# Patient Record
Sex: MALE | Race: WHITE | NOT HISPANIC OR LATINO | Employment: OTHER | URBAN - METROPOLITAN AREA
[De-identification: names, ages, dates, MRNs, and addresses within clinical notes are randomized per-mention and may not be internally consistent; named-entity substitution may affect disease eponyms.]

---

## 2018-02-13 ENCOUNTER — HOSPITAL ENCOUNTER (OUTPATIENT)
Dept: LAB | Facility: CLINIC | Age: 48
Discharge: HOME | End: 2018-02-13
Attending: INTERNAL MEDICINE | Admitting: INTERNAL MEDICINE
Payer: COMMERCIAL

## 2018-02-13 ENCOUNTER — OFFICE VISIT (OUTPATIENT)
Dept: SLEEP MEDICINE | Facility: CLINIC | Age: 48
End: 2018-02-13
Payer: COMMERCIAL

## 2018-02-13 VITALS
OXYGEN SATURATION: 95 % | SYSTOLIC BLOOD PRESSURE: 130 MMHG | DIASTOLIC BLOOD PRESSURE: 92 MMHG | BODY MASS INDEX: 26.96 KG/M2 | WEIGHT: 182 LBS | HEIGHT: 69 IN | RESPIRATION RATE: 16 BRPM | HEART RATE: 97 BPM

## 2018-02-13 DIAGNOSIS — G47.33 OSA (OBSTRUCTIVE SLEEP APNEA): Primary | ICD-10-CM

## 2018-02-13 DIAGNOSIS — E61.1 IRON DEFICIENCY: ICD-10-CM

## 2018-02-13 DIAGNOSIS — G25.81 RESTLESS LEGS SYNDROME (RLS): ICD-10-CM

## 2018-02-13 LAB
FERRITIN SERPL-MCNC: 366 NG/ML (ref 26–388)
IRON SATN MFR SERPL: 27 % (ref 15–46)
IRON SERPL-MCNC: 102 UG/DL (ref 35–180)
TIBC SERPL-MCNC: 373 UG/DL (ref 240–430)

## 2018-02-13 PROCEDURE — 83550 IRON BINDING TEST: CPT | Performed by: INTERNAL MEDICINE

## 2018-02-13 PROCEDURE — 99204 OFFICE O/P NEW MOD 45 MIN: CPT | Performed by: INTERNAL MEDICINE

## 2018-02-13 PROCEDURE — 83540 ASSAY OF IRON: CPT | Performed by: INTERNAL MEDICINE

## 2018-02-13 PROCEDURE — 36415 COLL VENOUS BLD VENIPUNCTURE: CPT | Performed by: INTERNAL MEDICINE

## 2018-02-13 PROCEDURE — 82728 ASSAY OF FERRITIN: CPT | Performed by: INTERNAL MEDICINE

## 2018-02-13 RX ORDER — GABAPENTIN 300 MG/1
300 CAPSULE ORAL EVERY EVENING
Qty: 30 CAPSULE | Refills: 3 | Status: SHIPPED | OUTPATIENT
Start: 2018-02-13 | End: 2018-06-12

## 2018-02-13 NOTE — MR AVS SNAPSHOT
"              After Visit Summary   2/13/2018    Wellington Moreno    MRN: [id number]           Patient Information     Date Of Birth          1970        Visit Information        Provider Department      2/13/2018 2:00 PM Suhas Cameron MD [facility]        Today's Diagnoses     ZHANG (obstructive sleep apnea)    -  1    Restless legs syndrome (RLS)        Iron deficiency          Care Instructions    1. Overnight sleep study \to assess sleep apnea   2. Start Gabapentin 300 mg at 10 pm   3. Blood test for iron deficiency   4. Follow up after sleep study       MY TREATMENT INFORMATION FOR SLEEP APNEA-  Wellington Josh    DOCTOR : Suhas Cameron MD  SLEEP CENTER :      MY CONTACT NUMBER:     Am I having a sleep study at a sleep center?  Make sure you have an appointment for the study before you leave!    Am I having a home sleep study?  Watch this video:  https://www.[email or web address]/watch?v=CteI_GhyP9g&[email or web address]    Frequently asked questions:  1. What is Obstructive Sleep Apnea (ZHANG)? ZHANG is the most common type of sleep apnea. Apnea means, \"without breath.\"  Apnea is most often caused by narrowing or collapse of the upper airway as muscles relax during sleep.   Almost everyone has occasional apneas. Most people with sleep apnea have had brief interruptions at night frequently for many years.  The severity of sleep apnea is related to how frequent and severe the events are.   2. What are the consequences of ZHANG? Symptoms include: feeling sleepy during the day, snoring loudly, gasping or stopping of breathing, trouble sleeping, and occasionally morning headaches or heartburn at night.  Sleepiness can be serious and even increase the risk of falling asleep while driving. Other health consequences may include development of high blood pressure and other cardiovascular disease in persons who are susceptible. Untreated ZHANG  can contribute to heart disease, stroke and diabetes.   3. What " are the treatment options? In most situations, sleep apnea is a lifelong disease that must be managed with daily therapy. Medications are not effective for sleep apnea and surgery is generally not considered until other therapies have been tried. Your treatment is your choice . Continuous Positive Airway (CPAP) works right away and is the therapy that is effective in nearly everyone. An oral device to hold your jaw forward is usually the next most reliable option. Other options include postioning devices (to keep you off your back), weight loss, and surgery including a tongue pacing device. There is more detail about some of these options below.    Important tips for using CPAP and similar devices   Know your equipment:  CPAP is continuous positive airway pressure that prevents obstructive sleep apnea by keeping the throat from collapsing while you are sleeping. In most cases, the device is  smart  and can slowly self-adjusts if your throat collapses and keeps a record every day of how well you are treated-this information is available to you and your care team.  BPAP is bilevel positive airway pressure that keeps your throat open and also assists each breath with a pressure boost to maintain adequate breathing.  Special kinds of BPAP are used in patients who have inadequate breathing from lung or heart disease. In most cases, the device is  smart  and can slowly self-adjusts to assist breathing. Like CPAP, the device keeps a record of how well you are treated.  Your mask is your connection to the device. You get to choose what feels most comfortable and the staff will help to make sure if fits. Here: are some examples of the different masks that are available:       Key points to remember on your journey with sleep apnea:  1. Sleep study.  PAP devices often need to be adjusted during a sleep study to show that they are effective and adjusted right.  2. Good tips to remember: Try wearing just the mask during a quiet  time during the day so your body adapts to wearing it. A humidifier is recommended for comfort in most cases to prevent drying of your nose and throat. Allergy medication from your provider may help you if you are having nasal congestion.  3. Getting settled-in. It takes more than one night for most of us to get used to wearing a mask. Try wearing just the mask during a quiet time during the day so your body adapts to wearing it. A humidifier is recommended for comfort in most cases. Our team will work with you carefully on the first day and will be in contact within 4 days and again at 2 and 4 weeks for advice and remote device adjustments. Your therapy is evaluated by the device each day.   4. Use it every night. The more you are able to sleep naturally for 7-8 hours, the more likely you will have good sleep and to prevent health risks or symptoms from sleep apnea. Even if you use it 4 hours it helps. Occasionally all of us are unable to use a medical therapy, in sleep apnea, it is not dangerous to miss one night.   5. Communicate. Call our skilled team on the number provided on the first day if your visit for problems that make it difficult to wear the device. Over 2 out of 3 patients can learn to wear the device long-term with help from our team. Remember to call our team or your sleep providers if you are unable to wear the device as we may have other solutions for those who cannot adapt to mask CPAP therapy. It is recommended that you sleep your sleep provider within the first 3 months and yearly after that if you are not having problems.   Take care of your equipment. Make sure you clean your mask and tubing using directions every day and that your filter and mask are replaced as recommended or if they are not working.     BESIDES CPAP, WHAT OTHER THERAPIES ARE THERE?    Positioning Device  Positioning devices are generally used when sleep apnea is mild and only occurs on your back.This example shows a pillow  that straps around the waist. It may be appropriate for those whose sleep study shows milder sleep apnea that occurs primarily when lying flat on one's back. Preliminary studies have shown benefit but effectiveness at home may need to be verified by a home sleep test. These devices are generally not covered by medical insurance.  Examples of devices that maintain sleeping on the back to prevent snoring and mild sleep apnea.    Belt type body positioner  Http://CadenceMD.SolveBoard/    Electronic reminder  Http://nightshifttherapy.com/  Http://www.[employer].SolveBoard.au/    Oral Appliance  What is oral appliance therapy?  An oral appliance device fits on your teeth at night like a retainer used after having braces. The device is made by a specialized dentist and requires several visits over 1-2 months before a manufactured device is made to fit your teeth and is adjusted to prevent your sleep apnea. Once an oral device is working properly, snoring should be improved. A home sleep test may be recommended at that time if to determine whether the sleep apnea is adequately treated.       Some things to remember:  -Oral devices are often, but not always, covered by your medical insurance. Be sure to check with your insurance provider.   -If you are referred for oral therapy, you will be given a list of specialized dentists to consider or you may choose to visit the Web site of the American Academy of Dental Sleep Medicine  -Oral devices are less likely to work if you have severe sleep apnea or are extremely overweight.     More detailed information  An oral appliance is a small acrylic device that fits over the upper and lower teeth  (similar to a retainer or a mouth guard). This device slightly moves jaw forward, which moves the base of the tongue forward, opens the airway, improves breathing for effective treat snoring and obstructive sleep apnea in perhaps 7 out of 10 people .  The best working devices are custom-made by a dental  device  after a mold is made of the teeth 1, 2, 3.  When is an oral appliance indicated?  Oral appliance therapy is recommended as a first-line treatment for patients with primary snoring, mild sleep apnea, and for patients with moderate sleep apnea who prefer appliance therapy to use of CPAP4, 5. Severity of sleep apnea is determined by sleep testing and is based on the number of respiratory events per hour of sleep.   How successful is oral appliance therapy?  The success rate of oral appliance therapy in patients with mild sleep apnea is 75-80% while in patients with moderate sleep apnea it is 50-70%. The chance of success in patients with severe sleep apnea is 40-50%. The research also shows that oral appliances have a beneficial effect on the cardiovascular health of ZHANG patients at the same magnitude as CPAP therapy7.  Oral appliances should be a second-line treatment in cases of severe sleep apnea, but if not completely successful then a combination therapy utilizing CPAP plus oral appliance therapy may be effective. Oral appliances tend to be effective in a broad range of patients although studies show that the patients who have the highest success are females, younger patients, those with milder disease, and less severe obesity. 3, 6.   Finding a dentist that practices dental sleep medicine  Specific training is available through the American Academy of Dental Sleep Medicine for dentists interested in working in the field of sleep. To find a dentist who is educated in the field of sleep and the use of oral appliances, near you, visit the Web site of the American Academy of Dental Sleep Medicine.    References  1. Grabiel et al. Objectively measured vs self-reported compliance during oral appliance therapy for sleep-disordered breathing. Chest 2013; 144(5): 3171-9550.  2. Ofe et al. Objective measurement of compliance during oral appliance therapy for sleep-disordered breathing.  Thorax 2013; 68(1): 91-96.  3. Christi et al. Mandibular advancement devices in 620 men and women with ZHANG and snoring: tolerability and predictors of treatment success. Chest 2004; 125: 1280-9424.  4. Grant et al. Oral appliances for snoring and ZHANG: a review. Sleep 2006; 29: 244-262.  5. Reina et al. Oral appliance treatment for ZHANG: an update. J Clin Sleep Med 2014; 10(2): 215-227.  6. Cheyanne et al. Predictors of OSAH treatment outcome. J Dent Res 2007; 86: 4245-7982.      Weight Loss:    Weight loss is a long-term strategy that may improve sleep apnea in some patients.    Weight management is a personal decision and the decision should be based on your interest and the potential benefits.  If you are interested in exploring weight loss strategies, the following discussion covers the impact on weight loss on sleep apnea and the approaches that may be successful.    Being overweight does not necessarily mean you will have health consequences.  Those who have BMI over 35 or over 27 with existing medical conditions carries greater risk.   Weight loss decreases severity of sleep apnea in most people with obesity. For those with mild obesity who have developed snoring with weight gain, even 15-30 pound weight loss can improve and occasionally eliminate sleep apnea.  Structured and life-long dietary and health habits are necessary to lose weight and keep healthier weight levels.     Though there may be significant health benefits from weight loss, long-term weight loss is very difficult to achieve- studies show success with dietary management in less than 10% of people. In addition, substantial weight loss may require years of dietary control and may be difficult if patients have severe obesity. In these cases, surgical management may be considered.  Finally, older individuals who have tolerated obesity without health complications may be less likely to benefit from weight loss strategies.        Your BMI  is Body mass index is 26.88 kg/(m^2).  Weight management is a personal decision.  If you are interested in exploring weight loss strategies, the following discussion covers the approaches that may be successful. Body mass index (BMI) is one way to tell whether you are at a healthy weight, overweight, or obese. It measures your weight in relation to your height.  A BMI of 18.5 to 24.9 is in the healthy range. A person with a BMI of 25 to 29.9 is considered overweight, and someone with a BMI of 30 or greater is considered obese. More than two-thirds of American adults are considered overweight or obese.  Being overweight or obese increases the risk for further weight gain. Excess weight may lead to heart disease and diabetes.  Creating and following plans for healthy eating and physical activity may help you improve your health.  Weight control is part of healthy lifestyle and includes exercise, emotional health, and healthy eating habits. Careful eating habits lifelong are the mainstay of weight control. Though there are significant health benefits from weight loss, long-term weight loss with diet alone may be very difficult to achieve- studies show long-term success with dietary management in less than 10% of people. Attaining a healthy weight may be especially difficult to achieve in those with severe obesity. In some cases, medications, devices and surgical management might be considered.  What can you do?  If you are overweight or obese and are interested in methods for weight loss, you should discuss this with your provider.     Consider reducing daily calorie intake by 500 calories.     Keep a food journal.     Avoiding skipping meals, consider cutting portions instead.    Diet combined with exercise helps maintain muscle while optimizing fat loss. Strength training is particularly important for building and maintaining muscle mass. Exercise helps reduce stress, increase energy, and improves fitness. Increasing  exercise without diet control, however, may not burn enough calories to loose weight.       Start walking three days a week 10-20 minutes at a time    Work towards walking thirty minutes five days a week     Eventually, increase the speed of your walking for 1-2 minutes at time    In addition, we recommend that you review healthy lifestyles and methods for weight loss available through the National Institutes of Health patient information sites:  http://win.niddk.nih.gov/publications/index.htm    And look into health and wellness programs that may be available through your health insurance provider, employer, local community center, or hernan club.    Weight management plan: Patient was referred to their PCP to discuss a diet and exercise plan.      Surgery:    Surgery for obstructive sleep apnea is considered generally only when other therapies fail to work. Surgery may be discussed with you if you are having a difficult time tolerating CPAP and or when there is an abnormal structure that requires surgical correction.  Nose and throat surgeries often enlarge the airway to prevent collapse.  Most of these surgeries create pain for 1-2 weeks and up to half of the most common surgeries are not effective throughout life.  You should carefully discuss the benefits and drawbacks to surgery with your sleep provider and surgeon to determine if it is the best solution for you.   More information  Surgery for ZHANG is directed at areas that are responsible for narrowing or complete obstruction of the airway during sleep.  There are a wide range of procedures available to enlarge and/or stabilize the airway to prevent blockage of breathing in the three major areas where it can occur: the palate, tongue, and nasal regions.  Successful surgical treatment depends on the accurate identification of the factors responsible for obstructive sleep apnea in each person.  A personalized approach is required because there is no single  treatment that works well for everyone.  Because of anatomic variation, consultation with an examination by a sleep surgeon is a critical first step in determining what surgical options are best for each patient.  In some cases, examination during sedation may be recommended in order to guide the selection of procedures.  Patients will be counseled about risks and benefits as well as the typical recovery course after surgery. Surgery is typically not a cure for a person s ZHANG.  However, surgery will often significantly improve one s ZHANG severity (termed  success rate ).  Even in the absence of a cure, surgery will decrease the cardiovascular risk associated with OSA7; improve overall quality of life8 (sleepiness, functionality, sleep quality, etc).      Palate Procedures:  Patients with ZHANG often have narrowing of their airway in the region of their tonsils and uvula.  The goals of palate procedures are to widen the airway in this region as well as to help the tissues resist collapse.  Modern palate procedure techniques focus on tissue conservation and soft tissue rearrangement, rather than tissue removal.  Often the uvula is preserved in this procedure. Residual sleep apnea is common in patient after pharyngoplasty with an average reduction in sleep apnea events of 33%2.      Tongue Procedures:  ExamWhile patients are awake, the muscles that surround the throat are active and keep this region open for breathing. These muscles relax during sleep, allowing the tongue and other structures to collapse and block breathing.  There are several different tongue procedures available.  Selection of a tongue base procedure depends on characteristics seen on physical exam.  Generally, procedures are aimed at removing bulky tissues in this area or preventing the back of the tongue from falling back during sleep.  Success rates for tongue surgery range from 50-62%3.    Hypoglossal Nerve Stimulation:  Hypoglossal nerve  stimulation has recently received approval from the [place] Food and Drug Administration for the treatment of obstructive sleep apnea.  This is based on research showing that the system was safe and effective in treating sleep apnea6.  Results showed that the median AHI score decreased 68%, from 29.3 to 9.0. This therapy uses an implant system that senses breathing patterns and delivers mild stimulation to airway muscles, which keeps the airway open during sleep.  The system consists of three fully implanted components: a small generator (similar in size to a pacemaker), a breathing sensor, and a stimulation lead.  Using a small handheld remote, a patient turns the therapy on before bed and off upon awakening.    Candidates for this device must be greater than 22 years of age, have moderate to severe ZHANG (AHI between 20-65), BMI less than 32, have tried CPAP/oral appliance without success, and have appropriate upper airway anatomy (determined by a sleep endoscopy performed by Dr. Colby).    Hypoglossal Nerve Stimulation Pathway:    The sleep surgeon s office will work with the patient through the insurance prior-authorization process (including communications and appeals).    Nasal Procedures:  Nasal obstruction can interfere with nasal breathing during the day and night.  Studies have shown that relief of nasal obstruction can improve the ability of some patients to tolerate positive airway pressure therapy for obstructive sleep apnea1.  Treatment options include medications such as nasal saline, topical corticosteroid and antihistamine sprays, and oral medications such as antihistamines or decongestants. Non-surgical treatments can include external nasal dilators for selected patients. If these are not successful by themselves, surgery can improve the nasal airway either alone or in combination with these other options.      Combination Procedures:  Combination of surgical procedures and other treatments may  be recommended, particularly if patients have more than one area of narrowing or persistent positional disease.  The success rate of combination surgery ranges from 66-80%2,3.    References  1. Martha CONDE. The Role of the Nose in Snoring and Obstructive Sleep Apnoea: An Update.  Eur Arch Otorhinolaryngol. 2011; 268: 1365-73.  2.  Dion SM; Parviz JA; Ella JR; Pallanch JF; Cameron MB; Trevor SG; Salena YANEZ. Surgical modifications of the upper airway for obstructive sleep apnea in adults: a systematic review and meta-analysis. SLEEP 2010;33(10):2007-6783. Dee NORRIS. Hypopharyngeal surgery in obstructive sleep apnea: an evidence-based medicine review.  Arch Otolaryngol Head Neck Surg. 2006 Feb;132(2):206-13.  3. Erlin REEVESH1, Rocio Y, Behzad OLEGARIO. The efficacy of anatomically based multilevel surgery for obstructive sleep apnea. Otolaryngol Head Neck Surg. 2003 Oct;129(4):327-35.  4. Dee NORRIS, Goldberg A. Hypopharyngeal Surgery in Obstructive Sleep Apnea: An Evidence-Based Medicine Review. Arch Otolaryngol Head Neck Surg. 2006 Feb;132(2):206-13.  5. Elissa PJ et al. Upper-Airway Stimulation for Obstructive Sleep Apnea.  N Engl J Med. 2014 Jan 9;370(2):139-49.  6. Moni Y et al. Increased Incidence of Cardiovascular Disease in Middle-aged Men with Obstructive Sleep Apnea. Am J Respir Crit Care Med; 2002 166: 159-165  7. Wiley EM et al. Studying Life Effects and Effectiveness of Palatopharyngoplasty (SLEEP) study: Subjective Outcomes of Isolated Uvulopalatopharyngoplasty. Otolaryngol Head Neck Surg. 2011; 144: 623-631.                Follow-ups after your visit        Your next 10 appointments already scheduled     Mar 20, 2018  8:30 PM CDT   PSG Diagnostic with BED 3 SH SLEEP   [facility] ([facility] [place])    [address] [address]   [phone]            Apr 04, 2018  1:30 PM CDT   Return Sleep Patient with Suhas Rakesh, MD   [facility]  "[facility] ([place] [facility])    [address] [id number] [phone]              Future tests that were ordered for you today     Open Future Orders        Priority Expected Expires Ordered    Ferritin Routine  2018    Iron and Iron Binding Capacity Routine  2018    Comprehensive Sleep Study Routine  2018            Who to contact     If you have questions or need follow up information about today's clinic visit or your schedule please contact [facility] directly at [phone].  Normal or non-critical lab and imaging results will be communicated to you by PubNubhart, letter or phone within 4 business days after the clinic has received the results. If you do not hear from us within 7 days, please contact the clinic through PubNubhart or phone. If you have a critical or abnormal lab result, we will notify you by phone as soon as possible.  Submit refill requests through Celery or call your pharmacy and they will forward the refill request to us. Please allow 3 business days for your refill to be completed.          Additional Information About Your Visit        [facility] Information     Celery lets you send messages to your doctor, view your test results, renew your prescriptions, schedule appointments and more. To sign up, go to [email or web address] . Click on \"Log in\" on the left side of the screen, which will take you to the Welcome page. Then click on \"Sign up Now\" on the right side of the page.     You will be asked to enter the access code listed below, as well as some personal information. Please follow the directions to create your username and password.     Your access code is: [email or web address]  Expires: 2018  2:56 PM     Your access code will  in 90 days. If you need help or a new code, please call your [place] clinic or [phone].        Care EveryWhere ID     This is your Care EveryWhere " "ID. This could be used by other organizations to access your [place] medical records  [id number]        Your Vitals Were     Pulse Respirations Height Pulse Oximetry BMI (Body Mass Index)       97 16 1.753 m (5' 9\") 95% 26.88 kg/m2        Blood Pressure from Last 3 Encounters:   02/13/18 (!) 130/92    Weight from Last 3 Encounters:   02/13/18 82.6 kg (182 lb)                 Today's Medication Changes          These changes are accurate as of 2/13/18  2:56 PM.  If you have any questions, ask your nurse or doctor.               Start taking these medicines.        Dose/Directions    gabapentin 300 MG capsule   Commonly known as:  NEURONTIN   Used for:  Restless legs syndrome (RLS)        Dose:  300 mg   Take 1 capsule (300 mg) by mouth every evening   Quantity:  30 capsule   Refills:  3            Where to get your medicines      These medications were sent to [id number] [place], [place] - [address] [place] [address]  [address] [facility] [address]     Phone:  [phone]     gabapentin 300 MG capsule                Primary Care Provider Fax #    Physician No Ref-Primary [phone]       No address on file        Equal Access to Services     KIRK FIELDS : Hadgildardo mariscal Soviktoria, wamaycolda lurex, qaybta kaalmada adealena, pedro zuniga . So [facility] [phone].    ATENCIÓN: Si habla español, tiene a ward disposición servicios gratuitos de asistencia lingüística. Kathiame al [phone].    We comply with applicable federal civil rights laws and [place] laws. We do not discriminate on the basis of race, color, national origin, age, disability, sex, sexual orientation, or gender identity.            Thank you!     Thank you for choosing [facility]  for your care. Our goal is always to provide you with excellent care. Hearing back from our patients is one way we can continue to improve our services. Please take a few minutes to complete the " written survey that you may receive in the mail after your visit with us. Thank you!             Your Updated Medication List - Protect others around you: Learn how to safely use, store and throw away your medicines at www.disposemymeds.org.          This list is accurate as of 2/13/18  2:56 PM.  Always use your most recent med list.                   Brand Name Dispense Instructions for use Diagnosis    gabapentin 300 MG capsule    NEURONTIN    30 capsule    Take 1 capsule (300 mg) by mouth every evening    Restless legs syndrome (RLS)

## 2018-02-13 NOTE — PROGRESS NOTES
Sleep Evaluation:    Date on this visit: 2/13/2018    Primary Physician: No Ref-Primary, Physician     Chief complaint: sleep apnea    Presenting History:     Wellington Moreno reports nightly snoring and gasping for more than 5 years.     Patient had a previous sleep study at [facility]. He doesnot recollect which clinic he did it through. He was told that he has sleep apnea and was prescribed CPAP therapy. Patient could not tolerate CPAP an discontinued treatment after a month. He later tried a dental appliance which he could not tolerate.     Wellington does snore every night. Patient does have a regular bed partner. There is report of snoring, gasping and snorting.  He does have witnessed apneas. They never sleep separately.  Patient sleeps on his back and side. He has frequent morning dry mouth and no morning headaches,.     Wellington denies any bruxism, sleep walking, sleep talking, dream enactment, sleep paralysis, cataplexy and hypnogogic/hypnopompic hallucinations.    Patient has restless leg symptoms, Restless leg symptoms are experienced at bedtime 5 nights per week.     Wellington goes to sleep at 10:30 PM during the week. He wakes up at 9:00 AM without an alarm. He falls asleep in 90 minutes.  Wellington has difficulty falling asleep.  He wakes up 2-4 times a night for 10 minutes before falling back to sleep.  Wellington wakes up to go to the bathroom, uncertain reasons and snort arosuals.  On weekends, Wellington goes to sleep at 11:00 PM.  He wakes up at 9:00 AM without an alarm. He falls asleep in 60 minutes.  Patient gets an average of 8 hours of sleep per night.     Patient does not use electronics in bed and watch TV in bed.     Wellington does not do shift work.  He is currently unemployed.      He denies sleep walking as a child.  Wellington has difficulty breathing through his nose.      Patient's [place] Sleepiness score 15/24 consistent with moderate daytime sleepiness.      Wellington naps 0 times per week. He takes no inadvertant naps.  He  denies closing eyes, dozing and falling asleep while driving.   Patient was counseled on the importance of driving while alert, to pull over if drowsy, or nap before getting into the vehicle if sleepy.  He uses 1 sodas/day. Last caffeine intake is usually before 2 pm.    Allergies:    Not on File    Medications:    No current outpatient prescriptions on file.       Problem List:  There are no active problems to display for this patient.       Past Medical/Surgical History:  No past medical history on file.  No past surgical history on file.    Social History:  Social History     Social History     Marital status: Single     Spouse name: N/A     Number of children: N/A     Years of education: N/A     Occupational History     Not on file.     Social History Main Topics     Smoking status: Not on file     Smokeless tobacco: Not on file     Alcohol use Not on file     Drug use: Not on file     Sexual activity: Not on file     Other Topics Concern     Not on file     Social History Narrative       Family History:    Both parents and brother  have sleep apnea.     Review of Systems:  A complete review of systems reviewed by me is negative with the exeption of what has been mentioned in the history of present illness.  CONSTITUTIONAL:  POSITIVE for  weight gain  EYES: NEGATIVE for changes in vision, blind spots, double vision.  ENT:  POSITIVE for  sore throat, post-nasal drip and runny nose  CARDIAC: NEGATIVE for fast heartbeats or fluttering in chest, chest pain or pressure, breathlessness when lying flat, swollen legs or swollen feet.  NEUROLOGIC: NEGATIVE headaches, weakness or numbness in the arms or legs.  DERMATOLOGIC: NEGATIVE for rashes, new moles or change in mole(s)  PULMONARY:  POSITIVE for  SOB with activity, dry cough and productive cough  GASTROINTESTINAL: NEGATIVE for nausea or vomitting, loose or watery stools, fat or grease in stools, constipation, abdominal pain, bowel movements black in color or blood  "noted.  GENITOURINARY:  POSITIVE for  urinating more frequently than usual  MUSCULOSKELETAL: NEGATIVE for muscle pain, bone or joint pain, swollen joints.  ENDOCRINE: NEGATIVE for increased thirst or urination, diabetes.  LYMPHATIC: NEGATIVE for swollen lymph nodes, lumps or bumps in the breasts or nipple discharge.    Physical Examination:  Vitals: BP (!) 130/92  Pulse 97  Resp 16  Ht 1.753 m (5' 9\")  Wt 82.6 kg (182 lb)  SpO2 95%  BMI 26.88 kg/m2  BMI= Body mass index is 26.88 kg/(m^2).    Neck Cir (cm): 39 cm    [place] Total Score 2/13/2018   Total score - [place] 15       GENERAL APPEARANCE: healthy, alert and no distress  EYES: Eyes grossly normal to inspection, PERRL and conjunctivae and sclerae normal  HENT: nose and mouth without ulcers or lesions, oropharynx crowded and soft palate dependent  NECK: no adenopathy, no asymmetry, masses, or scars and thyroid normal to palpation  RESP: lungs clear to auscultation - no rales, rhonchi or wheezes  CV: regular rates and rhythm, normal S1 S2, no S3 or S4 and no murmur, click or rub  ABDOMEN: soft, nontender, without hepatosplenomegaly or masses  MS: extremities normal- no gross deformities noted  NEURO: Normal strength and tone, mentation intact and speech normal  PSYCH: mentation appears normal and affect normal/bright  Mallampati Class: IV.  Tonsillar Stage: 1  hidden by pillars.    Impression/Plan:    1. Obstructive sleep apnea  2. Restless leg syndrome     - Patient, 47 years old male, BMI 26, neck circumference 39 cm, was previously diagnosed with obstructive sleep apnea. baseline severity of sleep apnea is not known as records are not available. He was intolerant of CPAP and dental appliance therapy but is willing to try CPAP again. I recommend a reevaluation of sleep apnea with a PSG to better inform our treatment options. Retrial or CPAP or consideration of upper airways surgery are options to consider based on severity of sleep apnea and response to " CPAP.      - Patient regularly experiences restless leg symptoms which causes sleep initiation difficulty. We discussed pharmacotherapy options of dopaminergic therapy and Gabapentin, Due to previous history of gambling, we decided to start gabapentin. I also recommended checking serum Ferritin to assess iron deficiency.     Plan:     1. PSG for reassessment of sleep apnea. Will plan for a diagnostic PSG for an assessment of baseline severity of sleep apnea.   2. Gabapentin 300 mg at night for restless leg syndrome   3. Check serum Ferritin and iron panel       He will follow up with me in approximately two weeks after his sleep study has been competed to review the results and discuss plan of care.       Polysomnography reviewed.  Obstructive sleep apnea reviewed.  Complications of untreated sleep apnea were reviewed.    I spent a total of 45 minutes with patient with more than 50% in counseling     Suhas Cameron MD     CC: No ref. provider found

## 2018-02-13 NOTE — PATIENT INSTRUCTIONS
"1. Overnight sleep study \to assess sleep apnea   2. Start Gabapentin 300 mg at 10 pm   3. Blood test for iron deficiency   4. Follow up after sleep study       MY TREATMENT INFORMATION FOR SLEEP APNEA-  Wellington Moreno    DOCTOR : Suhas Cameron MD  SLEEP CENTER :      MY CONTACT NUMBER:     Am I having a sleep study at a sleep center?  Make sure you have an appointment for the study before you leave!    Am I having a home sleep study?  Watch this video:  https://www.Visier.com/watch?v=CteI_GhyP9g&[email or web address]    Frequently asked questions:  1. What is Obstructive Sleep Apnea (ZHANG)? ZHANG is the most common type of sleep apnea. Apnea means, \"without breath.\"  Apnea is most often caused by narrowing or collapse of the upper airway as muscles relax during sleep.   Almost everyone has occasional apneas. Most people with sleep apnea have had brief interruptions at night frequently for many years.  The severity of sleep apnea is related to how frequent and severe the events are.   2. What are the consequences of ZHANG? Symptoms include: feeling sleepy during the day, snoring loudly, gasping or stopping of breathing, trouble sleeping, and occasionally morning headaches or heartburn at night.  Sleepiness can be serious and even increase the risk of falling asleep while driving. Other health consequences may include development of high blood pressure and other cardiovascular disease in persons who are susceptible. Untreated ZHANG  can contribute to heart disease, stroke and diabetes.   3. What are the treatment options? In most situations, sleep apnea is a lifelong disease that must be managed with daily therapy. Medications are not effective for sleep apnea and surgery is generally not considered until other therapies have been tried. Your treatment is your choice . Continuous Positive Airway (CPAP) works right away and is the therapy that is effective in nearly everyone. An oral device to hold your jaw " forward is usually the next most reliable option. Other options include postioning devices (to keep you off your back), weight loss, and surgery including a tongue pacing device. There is more detail about some of these options below.    Important tips for using CPAP and similar devices   Know your equipment:  CPAP is continuous positive airway pressure that prevents obstructive sleep apnea by keeping the throat from collapsing while you are sleeping. In most cases, the device is  smart  and can slowly self-adjusts if your throat collapses and keeps a record every day of how well you are treated-this information is available to you and your care team.  BPAP is bilevel positive airway pressure that keeps your throat open and also assists each breath with a pressure boost to maintain adequate breathing.  Special kinds of BPAP are used in patients who have inadequate breathing from lung or heart disease. In most cases, the device is  smart  and can slowly self-adjusts to assist breathing. Like CPAP, the device keeps a record of how well you are treated.  Your mask is your connection to the device. You get to choose what feels most comfortable and the staff will help to make sure if fits. Here: are some examples of the different masks that are available:       Key points to remember on your journey with sleep apnea:  1. Sleep study.  PAP devices often need to be adjusted during a sleep study to show that they are effective and adjusted right.  2. Good tips to remember: Try wearing just the mask during a quiet time during the day so your body adapts to wearing it. A humidifier is recommended for comfort in most cases to prevent drying of your nose and throat. Allergy medication from your provider may help you if you are having nasal congestion.  3. Getting settled-in. It takes more than one night for most of us to get used to wearing a mask. Try wearing just the mask during a quiet time during the day so your body adapts  to wearing it. A humidifier is recommended for comfort in most cases. Our team will work with you carefully on the first day and will be in contact within 4 days and again at 2 and 4 weeks for advice and remote device adjustments. Your therapy is evaluated by the device each day.   4. Use it every night. The more you are able to sleep naturally for 7-8 hours, the more likely you will have good sleep and to prevent health risks or symptoms from sleep apnea. Even if you use it 4 hours it helps. Occasionally all of us are unable to use a medical therapy, in sleep apnea, it is not dangerous to miss one night.   5. Communicate. Call our skilled team on the number provided on the first day if your visit for problems that make it difficult to wear the device. Over 2 out of 3 patients can learn to wear the device long-term with help from our team. Remember to call our team or your sleep providers if you are unable to wear the device as we may have other solutions for those who cannot adapt to mask CPAP therapy. It is recommended that you sleep your sleep provider within the first 3 months and yearly after that if you are not having problems.   Take care of your equipment. Make sure you clean your mask and tubing using directions every day and that your filter and mask are replaced as recommended or if they are not working.     BESIDES CPAP, WHAT OTHER THERAPIES ARE THERE?    Positioning Device  Positioning devices are generally used when sleep apnea is mild and only occurs on your back.This example shows a pillow that straps around the waist. It may be appropriate for those whose sleep study shows milder sleep apnea that occurs primarily when lying flat on one's back. Preliminary studies have shown benefit but effectiveness at home may need to be verified by a home sleep test. These devices are generally not covered by medical insurance.  Examples of devices that maintain sleeping on the back to prevent snoring and mild  sleep apnea.    Belt type body positioner  Http://[employer].com/    Electronic reminder  Http://nightshifttherapy.com/  Http://www.Assmblyd.com.au/    Oral Appliance  What is oral appliance therapy?  An oral appliance device fits on your teeth at night like a retainer used after having braces. The device is made by a specialized dentist and requires several visits over 1-2 months before a manufactured device is made to fit your teeth and is adjusted to prevent your sleep apnea. Once an oral device is working properly, snoring should be improved. A home sleep test may be recommended at that time if to determine whether the sleep apnea is adequately treated.       Some things to remember:  -Oral devices are often, but not always, covered by your medical insurance. Be sure to check with your insurance provider.   -If you are referred for oral therapy, you will be given a list of specialized dentists to consider or you may choose to visit the Web site of the American Academy of Dental Sleep Medicine  -Oral devices are less likely to work if you have severe sleep apnea or are extremely overweight.     More detailed information  An oral appliance is a small acrylic device that fits over the upper and lower teeth  (similar to a retainer or a mouth guard). This device slightly moves jaw forward, which moves the base of the tongue forward, opens the airway, improves breathing for effective treat snoring and obstructive sleep apnea in perhaps 7 out of 10 people .  The best working devices are custom-made by a dental device  after a mold is made of the teeth 1, 2, 3.  When is an oral appliance indicated?  Oral appliance therapy is recommended as a first-line treatment for patients with primary snoring, mild sleep apnea, and for patients with moderate sleep apnea who prefer appliance therapy to use of CPAP4, 5. Severity of sleep apnea is determined by sleep testing and is based on the number of respiratory events per  hour of sleep.   How successful is oral appliance therapy?  The success rate of oral appliance therapy in patients with mild sleep apnea is 75-80% while in patients with moderate sleep apnea it is 50-70%. The chance of success in patients with severe sleep apnea is 40-50%. The research also shows that oral appliances have a beneficial effect on the cardiovascular health of ZHANG patients at the same magnitude as CPAP therapy7.  Oral appliances should be a second-line treatment in cases of severe sleep apnea, but if not completely successful then a combination therapy utilizing CPAP plus oral appliance therapy may be effective. Oral appliances tend to be effective in a broad range of patients although studies show that the patients who have the highest success are females, younger patients, those with milder disease, and less severe obesity. 3, 6.   Finding a dentist that practices dental sleep medicine  Specific training is available through the American Academy of Dental Sleep Medicine for dentists interested in working in the field of sleep. To find a dentist who is educated in the field of sleep and the use of oral appliances, near you, visit the Web site of the American Academy of Dental Sleep Medicine.    References  1. Grabiel et al. Objectively measured vs self-reported compliance during oral appliance therapy for sleep-disordered breathing. Chest 2013; 144(5): 0958-8457.  2. Ofe et al. Objective measurement of compliance during oral appliance therapy for sleep-disordered breathing. Thorax 2013; 68(1): 91-96.  3. Christi, et al. Mandibular advancement devices in 620 men and women with ZHANG and snoring: tolerability and predictors of treatment success. Chest 2004; 125: 2703-0418.  4. Grant, et al. Oral appliances for snoring and ZHANG: a review. Sleep 2006; 29: 244-262.  5. Reina, et al. Oral appliance treatment for ZHANG: an update. J Clin Sleep Med 2014; 10(2): 215-227.  6. Cheyanne et al.  Predictors of OSAH treatment outcome. J Dent Res 2007; 86: 0233-1019.      Weight Loss:    Weight loss is a long-term strategy that may improve sleep apnea in some patients.    Weight management is a personal decision and the decision should be based on your interest and the potential benefits.  If you are interested in exploring weight loss strategies, the following discussion covers the impact on weight loss on sleep apnea and the approaches that may be successful.    Being overweight does not necessarily mean you will have health consequences.  Those who have BMI over 35 or over 27 with existing medical conditions carries greater risk.   Weight loss decreases severity of sleep apnea in most people with obesity. For those with mild obesity who have developed snoring with weight gain, even 15-30 pound weight loss can improve and occasionally eliminate sleep apnea.  Structured and life-long dietary and health habits are necessary to lose weight and keep healthier weight levels.     Though there may be significant health benefits from weight loss, long-term weight loss is very difficult to achieve- studies show success with dietary management in less than 10% of people. In addition, substantial weight loss may require years of dietary control and may be difficult if patients have severe obesity. In these cases, surgical management may be considered.  Finally, older individuals who have tolerated obesity without health complications may be less likely to benefit from weight loss strategies.        Your BMI is Body mass index is 26.88 kg/(m^2).  Weight management is a personal decision.  If you are interested in exploring weight loss strategies, the following discussion covers the approaches that may be successful. Body mass index (BMI) is one way to tell whether you are at a healthy weight, overweight, or obese. It measures your weight in relation to your height.  A BMI of 18.5 to 24.9 is in the healthy range. A  person with a BMI of 25 to 29.9 is considered overweight, and someone with a BMI of 30 or greater is considered obese. More than two-thirds of American adults are considered overweight or obese.  Being overweight or obese increases the risk for further weight gain. Excess weight may lead to heart disease and diabetes.  Creating and following plans for healthy eating and physical activity may help you improve your health.  Weight control is part of healthy lifestyle and includes exercise, emotional health, and healthy eating habits. Careful eating habits lifelong are the mainstay of weight control. Though there are significant health benefits from weight loss, long-term weight loss with diet alone may be very difficult to achieve- studies show long-term success with dietary management in less than 10% of people. Attaining a healthy weight may be especially difficult to achieve in those with severe obesity. In some cases, medications, devices and surgical management might be considered.  What can you do?  If you are overweight or obese and are interested in methods for weight loss, you should discuss this with your provider.     Consider reducing daily calorie intake by 500 calories.     Keep a food journal.     Avoiding skipping meals, consider cutting portions instead.    Diet combined with exercise helps maintain muscle while optimizing fat loss. Strength training is particularly important for building and maintaining muscle mass. Exercise helps reduce stress, increase energy, and improves fitness. Increasing exercise without diet control, however, may not burn enough calories to loose weight.       Start walking three days a week 10-20 minutes at a time    Work towards walking thirty minutes five days a week     Eventually, increase the speed of your walking for 1-2 minutes at time    In addition, we recommend that you review healthy lifestyles and methods for weight loss available through the National Institutes  [facility] patient information sites:  http://win.niddk.nih.gov/publications/index.htm    And look into health and wellness programs that may be available through your health insurance provider, employer, local community center, or [employer].    Weight management plan: Patient was referred to their PCP to discuss a diet and exercise plan.      Surgery:    Surgery for obstructive sleep apnea is considered generally only when other therapies fail to work. Surgery may be discussed with you if you are having a difficult time tolerating CPAP and or when there is an abnormal structure that requires surgical correction.  Nose and throat surgeries often enlarge the airway to prevent collapse.  Most of these surgeries create pain for 1-2 weeks and up to half of the most common surgeries are not effective throughout life.  You should carefully discuss the benefits and drawbacks to surgery with your sleep provider and surgeon to determine if it is the best solution for you.   More information  Surgery for ZHANG is directed at areas that are responsible for narrowing or complete obstruction of the airway during sleep.  There are a wide range of procedures available to enlarge and/or stabilize the airway to prevent blockage of breathing in the three major areas where it can occur: the palate, tongue, and nasal regions.  Successful surgical treatment depends on the accurate identification of the factors responsible for obstructive sleep apnea in each person.  A personalized approach is required because there is no single treatment that works well for everyone.  Because of anatomic variation, consultation with an examination by a sleep surgeon is a critical first step in determining what surgical options are best for each patient.  In some cases, examination during sedation may be recommended in order to guide the selection of procedures.  Patients will be counseled about risks and benefits as well as the typical recovery course  after surgery. Surgery is typically not a cure for a person s ZHANG.  However, surgery will often significantly improve one s ZHANG severity (termed  success rate ).  Even in the absence of a cure, surgery will decrease the cardiovascular risk associated with OSA7; improve overall quality of life8 (sleepiness, functionality, sleep quality, etc).      Palate Procedures:  Patients with ZHANG often have narrowing of their airway in the region of their tonsils and uvula.  The goals of palate procedures are to widen the airway in this region as well as to help the tissues resist collapse.  Modern palate procedure techniques focus on tissue conservation and soft tissue rearrangement, rather than tissue removal.  Often the uvula is preserved in this procedure. Residual sleep apnea is common in patient after pharyngoplasty with an average reduction in sleep apnea events of 33%2.      Tongue Procedures:  ExamWhile patients are awake, the muscles that surround the throat are active and keep this region open for breathing. These muscles relax during sleep, allowing the tongue and other structures to collapse and block breathing.  There are several different tongue procedures available.  Selection of a tongue base procedure depends on characteristics seen on physical exam.  Generally, procedures are aimed at removing bulky tissues in this area or preventing the back of the tongue from falling back during sleep.  Success rates for tongue surgery range from 50-62%3.    Hypoglossal Nerve Stimulation:  Hypoglossal nerve stimulation has recently received approval from the [place] Food and Drug Administration for the treatment of obstructive sleep apnea.  This is based on research showing that the system was safe and effective in treating sleep apnea6.  Results showed that the median AHI score decreased 68%, from 29.3 to 9.0. This therapy uses an implant system that senses breathing patterns and delivers mild stimulation to airway  muscles, which keeps the airway open during sleep.  The system consists of three fully implanted components: a small generator (similar in size to a pacemaker), a breathing sensor, and a stimulation lead.  Using a small handheld remote, a patient turns the therapy on before bed and off upon awakening.    Candidates for this device must be greater than 22 years of age, have moderate to severe ZHANG (AHI between 20-65), BMI less than 32, have tried CPAP/oral appliance without success, and have appropriate upper airway anatomy (determined by a sleep endoscopy performed by Dr. Colby).    Hypoglossal Nerve Stimulation Pathway:    The sleep surgeon s office will work with the patient through the insurance prior-authorization process (including communications and appeals).    Nasal Procedures:  Nasal obstruction can interfere with nasal breathing during the day and night.  Studies have shown that relief of nasal obstruction can improve the ability of some patients to tolerate positive airway pressure therapy for obstructive sleep apnea1.  Treatment options include medications such as nasal saline, topical corticosteroid and antihistamine sprays, and oral medications such as antihistamines or decongestants. Non-surgical treatments can include external nasal dilators for selected patients. If these are not successful by themselves, surgery can improve the nasal airway either alone or in combination with these other options.      Combination Procedures:  Combination of surgical procedures and other treatments may be recommended, particularly if patients have more than one area of narrowing or persistent positional disease.  The success rate of combination surgery ranges from 66-80%2,3.    References  1. Martha CONDE. The Role of the Nose in Snoring and Obstructive Sleep Apnoea: An Update.  Eur Arch Otorhinolaryngol. 2011; 268: 1365-73.  2.  Dion SM; Parviz SEO; Ella SALINAS; Pallanch JF; Cameron DAVALOS; Trevor MICHAEL; Salena YANEZ. Surgical  modifications of the upper airway for obstructive sleep apnea in adults: a systematic review and meta-analysis. SLEEP 2010;33(10):2803-4639. Dee NORRIS. Hypopharyngeal surgery in obstructive sleep apnea: an evidence-based medicine review.  Arch Otolaryngol Head Neck Surg. 2006 Feb;132(2):206-13.  3. Erlin YH1, Rocio Y, Behzad OLEGARIO. The efficacy of anatomically based multilevel surgery for obstructive sleep apnea. Otolaryngol Head Neck Surg. 2003 Oct;129(4):327-35.  4. Kezirian E, Goldberg A. Hypopharyngeal Surgery in Obstructive Sleep Apnea: An Evidence-Based Medicine Review. Arch Otolaryngol Head Neck Surg. 2006 Feb;132(2):206-13.  5. Elissa VELASQUEZ et al. Upper-Airway Stimulation for Obstructive Sleep Apnea.  N Engl J Med. 2014 Jan 9;370(2):139-49.  6. Moni Y et al. Increased Incidence of Cardiovascular Disease in Middle-aged Men with Obstructive Sleep Apnea. Am J Respir Crit Care Med; 2002 166: 159-165  7. Wiley EM et al. Studying Life Effects and Effectiveness of Palatopharyngoplasty (SLEEP) study: Subjective Outcomes of Isolated Uvulopalatopharyngoplasty. Otolaryngol Head Neck Surg. 2011; 144: 623-631.

## 2018-02-13 NOTE — NURSING NOTE
"Chief Complaint   Patient presents with     Sleep Problem       Initial BP (!) 130/92  Pulse 97  Resp 16  Ht 1.753 m (5' 9\")  Wt 82.6 kg (182 lb)  SpO2 95%  BMI 26.88 kg/m2 Estimated body mass index is 26.88 kg/(m^2) as calculated from the following:    Height as of this encounter: 1.753 m (5' 9\").    Weight as of this encounter: 82.6 kg (182 lb).  Medication Reconciliation: complete     ESS 15  Neck 39cm  Cammy Portillo    "

## 2018-06-12 DIAGNOSIS — G25.81 RESTLESS LEGS SYNDROME (RLS): ICD-10-CM

## 2018-06-14 RX ORDER — GABAPENTIN 300 MG/1
300 CAPSULE ORAL EVERY EVENING
Qty: 30 CAPSULE | Refills: 3 | Status: SHIPPED | OUTPATIENT
Start: 2018-06-14 | End: 2018-11-05

## 2018-11-05 DIAGNOSIS — G25.81 RESTLESS LEGS SYNDROME (RLS): ICD-10-CM

## 2018-11-05 RX ORDER — GABAPENTIN 300 MG/1
300 CAPSULE ORAL EVERY EVENING
Qty: 30 CAPSULE | Refills: 3 | Status: SHIPPED | OUTPATIENT
Start: 2018-11-05 | End: 2019-03-25

## 2019-02-28 DIAGNOSIS — R06.09 DOE (DYSPNEA ON EXERTION): Primary | ICD-10-CM

## 2019-03-06 ENCOUNTER — TELEPHONE (OUTPATIENT)
Dept: CARDIOLOGY | Facility: OTHER | Age: 49
End: 2019-03-06

## 2019-03-06 NOTE — TELEPHONE ENCOUNTER
Pt returned my call. Patient verified .  Reminder call for stress test with instructions given.  Patient verbalized understanding. States he will be able to use bike for stress echo.

## 2019-03-19 ENCOUNTER — TELEPHONE (OUTPATIENT)
Dept: CARDIOLOGY | Facility: OTHER | Age: 49
End: 2019-03-19

## 2019-03-20 ENCOUNTER — HOSPITAL ENCOUNTER (OUTPATIENT)
Dept: CARDIOLOGY | Facility: OTHER | Age: 49
Discharge: HOME | End: 2019-03-20
Attending: INTERNAL MEDICINE | Admitting: INTERNAL MEDICINE
Payer: COMMERCIAL

## 2019-03-20 DIAGNOSIS — R06.09 DOE (DYSPNEA ON EXERTION): ICD-10-CM

## 2019-03-20 PROCEDURE — 93016 CV STRESS TEST SUPVJ ONLY: CPT | Performed by: INTERNAL MEDICINE

## 2019-03-20 PROCEDURE — 93018 CV STRESS TEST I&R ONLY: CPT | Performed by: INTERNAL MEDICINE

## 2019-03-20 PROCEDURE — 40000264 ECHO STRESS ECHOCARDIOGRAM

## 2019-03-20 PROCEDURE — 25500064 ZZH RX 255 OP 636: Performed by: NURSE PRACTITIONER

## 2019-03-20 PROCEDURE — 93350 STRESS TTE ONLY: CPT | Mod: 26 | Performed by: INTERNAL MEDICINE

## 2019-03-20 PROCEDURE — 93325 DOPPLER ECHO COLOR FLOW MAPG: CPT | Mod: 26 | Performed by: INTERNAL MEDICINE

## 2019-03-20 PROCEDURE — 93321 DOPPLER ECHO F-UP/LMTD STD: CPT | Mod: 26 | Performed by: INTERNAL MEDICINE

## 2019-03-20 RX ORDER — VENLAFAXINE HYDROCHLORIDE 150 MG/1
225 CAPSULE, EXTENDED RELEASE ORAL DAILY
COMMUNITY
End: 2022-06-20

## 2019-03-20 RX ORDER — OLOPATADINE HYDROCHLORIDE 1 MG/ML
1 SOLUTION/ DROPS OPHTHALMIC 2 TIMES DAILY
COMMUNITY

## 2019-03-20 RX ORDER — FLUTICASONE PROPIONATE 50 MCG
SPRAY, SUSPENSION (ML) NASAL
COMMUNITY
Start: 2018-11-05

## 2019-03-20 RX ORDER — BUPROPION HYDROCHLORIDE 150 MG/1
TABLET ORAL
COMMUNITY
Start: 2019-03-17 | End: 2020-03-07

## 2019-03-20 RX ORDER — LANSOPRAZOLE 30 MG/1
30 CAPSULE, DELAYED RELEASE ORAL DAILY
COMMUNITY
End: 2020-02-01

## 2019-03-20 RX ORDER — ZOLPIDEM TARTRATE 10 MG/1
10 TABLET ORAL
COMMUNITY
End: 2020-03-07

## 2019-03-20 RX ADMIN — PERFLUTREN 3 ML: 6.52 INJECTION, SUSPENSION INTRAVENOUS at 14:00

## 2019-03-20 NOTE — PROGRESS NOTES
1300The patient arrived for a stress echo.  The procedure, risks and benefits were discussed and the consent was signed.  The patient was prepped for the stress test, and the echo sonographer did the initial images with definity for image enhancement.   Nicole Harvey NParrived, and the patient biked 12 minutes and 30 seconds.  The patient became short of breath, his leg muscles burned, and his BP was very elevated. These symptoms lessoned with rest, when the test was ended, just 3 beats before target heart rate.  Stress images were completed and the patient was released in stable condition.  Please see the chart for complete test results.

## 2019-03-25 DIAGNOSIS — G25.81 RESTLESS LEGS SYNDROME (RLS): ICD-10-CM

## 2019-03-25 RX ORDER — GABAPENTIN 300 MG/1
300 CAPSULE ORAL EVERY EVENING
Qty: 30 CAPSULE | Refills: 3 | Status: SHIPPED | OUTPATIENT
Start: 2019-03-25 | End: 2019-07-16

## 2019-07-15 DIAGNOSIS — G25.81 RESTLESS LEGS SYNDROME (RLS): ICD-10-CM

## 2019-07-16 RX ORDER — GABAPENTIN 300 MG/1
300 CAPSULE ORAL EVERY EVENING
Qty: 30 CAPSULE | Refills: 3 | Status: SHIPPED | OUTPATIENT
Start: 2019-07-16 | End: 2020-03-07

## 2019-12-07 ENCOUNTER — OFFICE VISIT (OUTPATIENT)
Dept: FAMILY MEDICINE | Facility: OTHER | Age: 49
End: 2019-12-07
Attending: PHYSICIAN ASSISTANT
Payer: COMMERCIAL

## 2019-12-07 ENCOUNTER — HOSPITAL ENCOUNTER (OUTPATIENT)
Dept: GENERAL RADIOLOGY | Facility: OTHER | Age: 49
Discharge: HOME | End: 2019-12-07
Attending: FAMILY MEDICINE | Admitting: FAMILY MEDICINE
Payer: COMMERCIAL

## 2019-12-07 VITALS
OXYGEN SATURATION: 93 % | DIASTOLIC BLOOD PRESSURE: 92 MMHG | BODY MASS INDEX: 27.96 KG/M2 | TEMPERATURE: 100.3 F | RESPIRATION RATE: 16 BRPM | HEART RATE: 92 BPM | WEIGHT: 184.5 LBS | HEIGHT: 68 IN | SYSTOLIC BLOOD PRESSURE: 122 MMHG

## 2019-12-07 DIAGNOSIS — R05.9 COUGH: ICD-10-CM

## 2019-12-07 DIAGNOSIS — R50.9 FEVER AND CHILLS: ICD-10-CM

## 2019-12-07 DIAGNOSIS — J18.9 PNEUMONIA OF LEFT LOWER LOBE DUE TO INFECTIOUS ORGANISM: Primary | ICD-10-CM

## 2019-12-07 LAB
BASOPHILS # BLD AUTO: 0 10E9/L (ref 0–0.2)
BASOPHILS NFR BLD AUTO: 0.2 %
DIFFERENTIAL METHOD BLD: NORMAL
EOSINOPHIL # BLD AUTO: 0.2 10E9/L (ref 0–0.7)
EOSINOPHIL NFR BLD AUTO: 1.9 %
ERYTHROCYTE [DISTWIDTH] IN BLOOD BY AUTOMATED COUNT: 12.7 % (ref 10–15)
FLUAV+FLUBV RNA SPEC QL NAA+PROBE: NEGATIVE
FLUAV+FLUBV RNA SPEC QL NAA+PROBE: NEGATIVE
HCT VFR BLD AUTO: 47 % (ref 40–53)
HGB BLD-MCNC: 15.5 G/DL (ref 13.3–17.7)
IMM GRANULOCYTES # BLD: 0 10E9/L (ref 0–0.4)
IMM GRANULOCYTES NFR BLD: 0.4 %
LYMPHOCYTES # BLD AUTO: 1.1 10E9/L (ref 0.8–5.3)
LYMPHOCYTES NFR BLD AUTO: 13.5 %
MCH RBC QN AUTO: 29.3 PG (ref 26.5–33)
MCHC RBC AUTO-ENTMCNC: 33 G/DL (ref 31.5–36.5)
MCV RBC AUTO: 89 FL (ref 78–100)
MONOCYTES # BLD AUTO: 1.1 10E9/L (ref 0–1.3)
MONOCYTES NFR BLD AUTO: 13.4 %
NEUTROPHILS # BLD AUTO: 5.9 10E9/L (ref 1.6–8.3)
NEUTROPHILS NFR BLD AUTO: 70.6 %
PLATELET # BLD AUTO: 233 10E9/L (ref 150–450)
RBC # BLD AUTO: 5.29 10E12/L (ref 4.4–5.9)
RSV RNA SPEC NAA+PROBE: NEGATIVE
SPECIMEN SOURCE: NORMAL
WBC # BLD AUTO: 8.4 10E9/L (ref 4–11)

## 2019-12-07 PROCEDURE — 25000128 H RX IP 250 OP 636: Performed by: FAMILY MEDICINE

## 2019-12-07 PROCEDURE — 85025 COMPLETE CBC W/AUTO DIFF WBC: CPT | Mod: ZL | Performed by: FAMILY MEDICINE

## 2019-12-07 PROCEDURE — 96372 THER/PROPH/DIAG INJ SC/IM: CPT | Performed by: FAMILY MEDICINE

## 2019-12-07 PROCEDURE — G0463 HOSPITAL OUTPT CLINIC VISIT: HCPCS

## 2019-12-07 PROCEDURE — 87631 RESP VIRUS 3-5 TARGETS: CPT | Mod: ZL | Performed by: FAMILY MEDICINE

## 2019-12-07 PROCEDURE — 96372 THER/PROPH/DIAG INJ SC/IM: CPT

## 2019-12-07 PROCEDURE — 71046 X-RAY EXAM CHEST 2 VIEWS: CPT

## 2019-12-07 PROCEDURE — 99214 OFFICE O/P EST MOD 30 MIN: CPT | Performed by: FAMILY MEDICINE

## 2019-12-07 PROCEDURE — 36415 COLL VENOUS BLD VENIPUNCTURE: CPT | Mod: ZL | Performed by: FAMILY MEDICINE

## 2019-12-07 PROCEDURE — G0463 HOSPITAL OUTPT CLINIC VISIT: HCPCS | Mod: 25

## 2019-12-07 RX ORDER — HYDROCORTISONE 2.5 %
CREAM (GRAM) TOPICAL
COMMUNITY
Start: 2019-12-04 | End: 2020-02-01

## 2019-12-07 RX ORDER — CEFUROXIME AXETIL 500 MG/1
500 TABLET ORAL 2 TIMES DAILY
Qty: 18 TABLET | Refills: 0 | Status: SHIPPED | OUTPATIENT
Start: 2019-12-07 | End: 2020-02-01

## 2019-12-07 RX ORDER — SILDENAFIL 100 MG/1
50 TABLET, FILM COATED ORAL
COMMUNITY
Start: 2019-08-26 | End: 2020-02-01

## 2019-12-07 RX ORDER — TERBINAFINE HYDROCHLORIDE 250 MG/1
250 TABLET ORAL
COMMUNITY
Start: 2019-08-13 | End: 2020-02-01

## 2019-12-07 RX ORDER — CEFTRIAXONE SODIUM 1 G
1 VIAL (EA) INJECTION ONCE
Status: COMPLETED | OUTPATIENT
Start: 2019-12-07 | End: 2019-12-07

## 2019-12-07 RX ORDER — AZITHROMYCIN 250 MG/1
TABLET, FILM COATED ORAL
Qty: 6 TABLET | Refills: 0 | Status: SHIPPED | OUTPATIENT
Start: 2019-12-07 | End: 2020-02-01

## 2019-12-07 RX ORDER — EMTRICITABINE AND TENOFOVIR DISOPROXIL FUMARATE 200; 300 MG/1; MG/1
1 TABLET, FILM COATED ORAL
COMMUNITY
Start: 2019-08-26 | End: 2020-03-07

## 2019-12-07 RX ORDER — ZALEPLON 10 MG/1
CAPSULE ORAL
Refills: 0 | COMMUNITY
Start: 2019-06-13 | End: 2020-02-01

## 2019-12-07 RX ORDER — LORATADINE 10 MG/1
TABLET ORAL
COMMUNITY
Start: 2019-11-27 | End: 2020-03-07

## 2019-12-07 RX ORDER — PANTOPRAZOLE SODIUM 40 MG/1
40 TABLET, DELAYED RELEASE ORAL
COMMUNITY
Start: 2019-07-29

## 2019-12-07 RX ORDER — TRIAMCINOLONE ACETONIDE 55 UG/1
SPRAY, METERED NASAL
Refills: 0 | COMMUNITY
Start: 2019-04-09 | End: 2020-02-01

## 2019-12-07 RX ADMIN — CEFTRIAXONE SODIUM 1 G: 1 INJECTION, POWDER, FOR SOLUTION INTRAMUSCULAR; INTRAVENOUS at 18:03

## 2019-12-07 SDOH — HEALTH STABILITY: MENTAL HEALTH: HOW OFTEN DO YOU HAVE A DRINK CONTAINING ALCOHOL?: 2-3 TIMES A WEEK

## 2019-12-07 ASSESSMENT — PAIN SCALES - GENERAL: PAINLEVEL: SEVERE PAIN (7)

## 2019-12-07 ASSESSMENT — MIFFLIN-ST. JEOR: SCORE: 1671.39

## 2019-12-07 NOTE — PROGRESS NOTES
"  SUBJECTIVE:   Wellington Moreno is a 49 year old male who presents to clinic today for the following health issues: myalgias and cough    HPI   Patient noticed the onset of diffuse achiness 5 days ago. Hasn't noticed any warm, swollen, or red joints, complains of pain in upper and lower back. Denies headache. Hasn't checked temp at home but has been having chills and sweats.  Feels foggy and \"dizzy\", which he describes as lightheaded.   No nausea, vomiting, or diarrhea.Denies urinary frequency, urgency, dysuria, hematuria.  Sister and mother were in town for 5 Minutes and sister was sick with similar symptoms, but hasn't been seen in clinic and doesn't have a diagnosis.   [facility] medication list reviewed. Hasn't abruptly discontinued any medications.    + flu shot this season.    Review of Systems   As per HPI.    OBJECTIVE:     BP (!) 122/92 (BP Location: Right arm, Patient Position: Sitting, Cuff Size: Adult Regular)   Pulse 92   Temp 100.3  F (37.9  C) (Tympanic)   Resp 16   Ht 1.727 m (5' 8\")   Wt 83.7 kg (184 lb 8 oz)   SpO2 93%   BMI 28.05 kg/m    Body mass index is 28.05 kg/m .     Physical Exam  General: Alert, appears mildly pale and fatigued, but in no respiratory distress  HEENT: Normocephalic.  TMs bilaterally nonerythematous with normal landmarks.  No frontal or maxillary sinus tenderness.  Pharynx nonerythematous, mucosa moist, no exudate  Neck: Supple without adenopathy  Heart: Regular rate and rhythm, normal S1-S2, no murmur  Lungs: Mildly diminished airflow in both bases, no rhonchi, crackles, or wheezes, no E to A changes  Abdomen: Normal bowel sounds in all 4 quadrants.  Soft with no palpable mass and no apparent tenderness, no HSM  Skin: No acute rash    Diagnostic Test Results:  Results for orders placed or performed in visit on 12/07/19 (from the past 24 hour(s))   CBC and Differential   Result Value Ref Range    WBC 8.4 4.0 - 11.0 10e9/L    RBC Count 5.29 4.4 - 5.9 10e12/L    " Hemoglobin 15.5 13.3 - 17.7 g/dL    Hematocrit 47.0 40.0 - 53.0 %    MCV 89 78 - 100 fl    MCH 29.3 26.5 - 33.0 pg    MCHC 33.0 31.5 - 36.5 g/dL    RDW 12.7 10.0 - 15.0 %    Platelet Count 233 150 - 450 10e9/L    Diff Method Automated Method     % Neutrophils 70.6 %    % Lymphocytes 13.5 %    % Monocytes 13.4 %    % Eosinophils 1.9 %    % Basophils 0.2 %    % Immature Granulocytes 0.4 %    Absolute Neutrophil 5.9 1.6 - 8.3 10e9/L    Absolute Lymphocytes 1.1 0.8 - 5.3 10e9/L    Absolute Monocytes 1.1 0.0 - 1.3 10e9/L    Absolute Eosinophils 0.2 0.0 - 0.7 10e9/L    Absolute Basophils 0.0 0.0 - 0.2 10e9/L    Abs Immature Granulocytes 0.0 0 - 0.4 10e9/L   Influenza A and B and RSV PCR   Result Value Ref Range    Specimen Description Nasal     Influenza A PCR Negative NEG^Negative    Influenza B PCR Negative NEG^Negative    Resp Syncytial Virus Negative NEG^Negative     CXR:  FINDINGS:   The cardiac silhouette is normal in size. The pulmonary vasculature is  normal.  There is a left perihilar infiltrate suspicious for pneumonia  No pleural effusion or pneumothorax.                                                                   IMPRESSION:  Left perihilar infiltrate suspicious for pneumonia        ASSESSMENT/PLAN:     1. Pneumonia of left lower lobe due to infectious organism (H)  Given normal vital signs and reassuring labs, meets criteria for outpatient treatment.  He is given ceftriaxone 1 g IM today and will start azithromycin and cefuroxime tomorrow when he is able to  medication from the pharmacy.  Reviewed rest and supportive care.  Return to work when feeling better and afebrile for at least 24 hours.  Follow-up in 2 weeks if improving, sooner if not improving.  Will need a follow-up x-ray to document clearing.  Follow-up    - cefTRIAXone (ROCEPHIN) injection 1 g  - cefuroxime (CEFTIN) 500 MG tablet; Take 1 tablet (500 mg) by mouth 2 times daily for 9 days  Dispense: 18 tablet; Refill: 0  - azithromycin  (ZITHROMAX) 250 MG tablet; Take 2 tablets (500 mg) by mouth daily for 1 day, THEN 1 tablet (250 mg) daily for 4 days.  Dispense: 6 tablet; Refill: 0    I explained my diagnostic considerations and recommendations to the patient, who voiced understanding and agreement with the treatment plan. All questions were answered. We discussed potential side effects of any prescribed or recommended therapies, as well as expectations for response to treatments.      AMAYA Jay MD  12/7/2019 at 4:54 PM  [facility]

## 2019-12-07 NOTE — NURSING NOTE
Patient in clinic for body aches and cough x 6 days. Dizziness started yesterday.  Tx with [place]-seltzer cold and flu, nyquil.    Julieta Freedman LPN LPN....................  12/7/2019   4:22 PM    Chief Complaint   Patient presents with     Cough     Generalized Body Aches       Medication Reconciliation: complete    Julieta Freedman LPN

## 2019-12-07 NOTE — PATIENT INSTRUCTIONS
Start the azithromycin tomorrow and take according to package directions.    Start the cefuroxime tomorrow evening and finish the 9 day supply.    See your primary provider for followup in about 2 weeks if feeling better, sooner if not.    Return to work when you have had no fever for 24 hours and are feeling much better.

## 2019-12-08 NOTE — NURSING NOTE
Xylocaine-MPF 1% ordered by CARMINE Jay.  Medication used for dilution with Ceftriaxone  Lot # [id number]  Exp. 1/23  NDC 92197-035-29    LISET Saab...... 12/7/2019 6:14 PM

## 2020-02-01 ENCOUNTER — HOSPITAL ENCOUNTER (EMERGENCY)
Facility: OTHER | Age: 50
Discharge: HOME | End: 2020-02-01
Attending: PHYSICIAN ASSISTANT | Admitting: PHYSICIAN ASSISTANT
Payer: COMMERCIAL

## 2020-02-01 ENCOUNTER — APPOINTMENT (OUTPATIENT)
Dept: GENERAL RADIOLOGY | Facility: OTHER | Age: 50
End: 2020-02-01
Attending: PHYSICIAN ASSISTANT
Payer: COMMERCIAL

## 2020-02-01 VITALS
HEIGHT: 68 IN | TEMPERATURE: 97.3 F | BODY MASS INDEX: 27.28 KG/M2 | DIASTOLIC BLOOD PRESSURE: 76 MMHG | SYSTOLIC BLOOD PRESSURE: 136 MMHG | RESPIRATION RATE: 16 BRPM | OXYGEN SATURATION: 95 % | WEIGHT: 180 LBS

## 2020-02-01 DIAGNOSIS — S67.10XA CRUSHING INJURY OF FINGER OF LEFT HAND: ICD-10-CM

## 2020-02-01 DIAGNOSIS — S62.635A FRACTURE OF DISTAL PHALANX OF LEFT RING FINGER: ICD-10-CM

## 2020-02-01 DIAGNOSIS — L03.012 CELLULITIS OF FINGER OF LEFT HAND: ICD-10-CM

## 2020-02-01 LAB
BASOPHILS # BLD AUTO: 0 10E9/L (ref 0–0.2)
BASOPHILS NFR BLD AUTO: 0.6 %
CRP SERPL-MCNC: 0.4 MG/L
DIFFERENTIAL METHOD BLD: NORMAL
EOSINOPHIL # BLD AUTO: 0.1 10E9/L (ref 0–0.7)
EOSINOPHIL NFR BLD AUTO: 2.8 %
ERYTHROCYTE [DISTWIDTH] IN BLOOD BY AUTOMATED COUNT: 13.6 % (ref 10–15)
ERYTHROCYTE [SEDIMENTATION RATE] IN BLOOD BY WESTERGREN METHOD: 5 MM/H (ref 1–10)
HCT VFR BLD AUTO: 45.8 % (ref 40–53)
HGB BLD-MCNC: 15.2 G/DL (ref 13.3–17.7)
IMM GRANULOCYTES # BLD: 0 10E9/L (ref 0–0.4)
IMM GRANULOCYTES NFR BLD: 0.2 %
LYMPHOCYTES # BLD AUTO: 1.4 10E9/L (ref 0.8–5.3)
LYMPHOCYTES NFR BLD AUTO: 30.6 %
MCH RBC QN AUTO: 29.3 PG (ref 26.5–33)
MCHC RBC AUTO-ENTMCNC: 33.2 G/DL (ref 31.5–36.5)
MCV RBC AUTO: 88 FL (ref 78–100)
MONOCYTES # BLD AUTO: 0.4 10E9/L (ref 0–1.3)
MONOCYTES NFR BLD AUTO: 8.9 %
NEUTROPHILS # BLD AUTO: 2.7 10E9/L (ref 1.6–8.3)
NEUTROPHILS NFR BLD AUTO: 56.9 %
PLATELET # BLD AUTO: 221 10E9/L (ref 150–450)
RBC # BLD AUTO: 5.19 10E12/L (ref 4.4–5.9)
WBC # BLD AUTO: 4.7 10E9/L (ref 4–11)

## 2020-02-01 PROCEDURE — 99284 EMERGENCY DEPT VISIT MOD MDM: CPT | Performed by: PHYSICIAN ASSISTANT

## 2020-02-01 PROCEDURE — 25000125 ZZHC RX 250: Performed by: PHYSICIAN ASSISTANT

## 2020-02-01 PROCEDURE — 73130 X-RAY EXAM OF HAND: CPT | Mod: LT

## 2020-02-01 PROCEDURE — 87070 CULTURE OTHR SPECIMN AEROBIC: CPT | Mod: XU | Performed by: PHYSICIAN ASSISTANT

## 2020-02-01 PROCEDURE — 87040 BLOOD CULTURE FOR BACTERIA: CPT | Performed by: PHYSICIAN ASSISTANT

## 2020-02-01 PROCEDURE — 25000128 H RX IP 250 OP 636: Performed by: PHYSICIAN ASSISTANT

## 2020-02-01 PROCEDURE — 96372 THER/PROPH/DIAG INJ SC/IM: CPT | Performed by: PHYSICIAN ASSISTANT

## 2020-02-01 PROCEDURE — 86140 C-REACTIVE PROTEIN: CPT | Performed by: PHYSICIAN ASSISTANT

## 2020-02-01 PROCEDURE — 99284 EMERGENCY DEPT VISIT MOD MDM: CPT | Mod: Z6 | Performed by: PHYSICIAN ASSISTANT

## 2020-02-01 PROCEDURE — 85652 RBC SED RATE AUTOMATED: CPT | Performed by: PHYSICIAN ASSISTANT

## 2020-02-01 PROCEDURE — 85025 COMPLETE CBC W/AUTO DIFF WBC: CPT | Performed by: PHYSICIAN ASSISTANT

## 2020-02-01 PROCEDURE — 36415 COLL VENOUS BLD VENIPUNCTURE: CPT | Performed by: PHYSICIAN ASSISTANT

## 2020-02-01 RX ORDER — CEFTRIAXONE SODIUM 1 G
1 VIAL (EA) INJECTION ONCE
Status: COMPLETED | OUTPATIENT
Start: 2020-02-01 | End: 2020-02-01

## 2020-02-01 RX ORDER — HYDROCODONE BITARTRATE AND ACETAMINOPHEN 5; 325 MG/1; MG/1
1 TABLET ORAL EVERY 6 HOURS PRN
Qty: 10 TABLET | Refills: 0 | Status: SHIPPED | OUTPATIENT
Start: 2020-02-01 | End: 2020-03-07

## 2020-02-01 RX ORDER — IBUPROFEN 800 MG/1
800 TABLET, FILM COATED ORAL EVERY 8 HOURS PRN
Qty: 60 TABLET | Refills: 0 | Status: SHIPPED | OUTPATIENT
Start: 2020-02-01

## 2020-02-01 RX ORDER — SULFAMETHOXAZOLE/TRIMETHOPRIM 800-160 MG
1 TABLET ORAL 2 TIMES DAILY
Qty: 20 TABLET | Refills: 0 | Status: SHIPPED | OUTPATIENT
Start: 2020-02-01 | End: 2020-05-19

## 2020-02-01 RX ADMIN — LIDOCAINE HYDROCHLORIDE 1 G: 10 INJECTION, SOLUTION EPIDURAL; INFILTRATION; INTRACAUDAL; PERINEURAL at 11:55

## 2020-02-01 ASSESSMENT — ENCOUNTER SYMPTOMS
CHILLS: 0
DIZZINESS: 0
WEAKNESS: 0
ACTIVITY CHANGE: 0
BRUISES/BLEEDS EASILY: 0
CONFUSION: 0
COUGH: 0
NAUSEA: 0
SEIZURES: 0
APPETITE CHANGE: 0
TROUBLE SWALLOWING: 0
WOUND: 0
SHORTNESS OF BREATH: 0
CHEST TIGHTNESS: 0
DIARRHEA: 0
SINUS PRESSURE: 0
HEMATURIA: 0
BACK PAIN: 0
HEADACHES: 0
ADENOPATHY: 0
FREQUENCY: 0
VOICE CHANGE: 0
FACIAL SWELLING: 0
ABDOMINAL PAIN: 0
FEVER: 0
FATIGUE: 0
DYSURIA: 0
EYE PAIN: 0
NECK PAIN: 0
SORE THROAT: 0
VOMITING: 0
LIGHT-HEADEDNESS: 0

## 2020-02-01 ASSESSMENT — MIFFLIN-ST. JEOR: SCORE: 1650.97

## 2020-02-01 NOTE — ED PROVIDER NOTES
History     Chief Complaint   Patient presents with     Hand Pain     left     This is a 49-year-old male who was at home working on some glass.  He was using a hammer and striking a portion of it when he accidentally hit his left hand in the webspace between his thumb and index finger.  He is unsure but he did sustain a puncture wound.  He is concerned that maybe he has some glass inside the wound itself.  Today he has noticed some increasing redness and warmth to the surrounding area and he is here for further evaluation.  He is current on his tetanus having received one in February 2019.  Denies any other issues.  He thought maybe saw little pus and blood this morning from the wound.  But denies any significant draining.  Denies any fever or chills.  No body aches.  No lightheadedness or dizziness.  No nausea or vomiting.  Denies any other issues.  He is right-hand dominant.            Allergies:  Allergies   Allergen Reactions     Dogs      Pollen Extract      Trees, flowers grass       Problem List:    There are no active problems to display for this patient.       Past Medical History:    History reviewed. No pertinent past medical history.    Past Surgical History:    History reviewed. No pertinent surgical history.    Family History:    No family history on file.    Social History:  Marital Status:  Single [1]  Social History     Tobacco Use     Smoking status: Never Smoker     Smokeless tobacco: Never Used   Substance Use Topics     Alcohol use: Yes     Frequency: 2-3 times a week     Drug use: Never        Medications:    buPROPion (WELLBUTRIN XL) 150 MG 24 hr tablet  fluticasone (FLONASE) 50 MCG/ACT nasal spray  gabapentin (NEURONTIN) 300 MG capsule  loratadine (CLARITIN) 10 MG tablet  olopatadine (PATANOL) 0.1 % ophthalmic solution  pantoprazole (PROTONIX) 40 MG EC tablet  venlafaxine (EFFEXOR-XR) 150 MG 24 hr capsule  zolpidem (AMBIEN) 10 MG tablet  emtricitabine-tenofovir (TRUVADA) 200-300 MG per  "tablet          Review of Systems   Constitutional: Negative for activity change, appetite change, chills, fatigue and fever.   HENT: Negative for congestion, facial swelling, sinus pressure, sore throat, trouble swallowing and voice change.    Eyes: Negative for pain and visual disturbance.   Respiratory: Negative for cough, chest tightness and shortness of breath.    Cardiovascular: Negative for chest pain.   Gastrointestinal: Negative for abdominal pain, diarrhea, nausea and vomiting.   Genitourinary: Negative for dysuria, frequency, hematuria and urgency.   Musculoskeletal: Negative for back pain and neck pain.        Left hand injury with a small puncture wound to the area in the webspace between the index and  thumb.  Erythema noted.  Minimal drainage from the wound.   Skin: Negative for rash and wound.   Neurological: Negative for dizziness, seizures, syncope, weakness, light-headedness and headaches.   Hematological: Negative for adenopathy. Does not bruise/bleed easily.   Psychiatric/Behavioral: Negative for confusion.       Physical Exam   BP: 136/76  Heart Rate: 79  Temp: 97.3  F (36.3  C)  Resp: 16  Height: 172.7 cm (5' 8\")  Weight: 81.6 kg (180 lb)  SpO2: 95 %      Physical Exam  Constitutional:       General: He is not in acute distress.     Appearance: He is not ill-appearing, toxic-appearing or diaphoretic.   HENT:      Head: No raccoon eyes or Hurley's sign.      Jaw: No trismus.      Right Ear: No drainage or tenderness.      Left Ear: No drainage or tenderness.      Nose: Nose normal.   Eyes:      General: No scleral icterus.     Extraocular Movements: Extraocular movements intact.      Right eye: Normal extraocular motion and no nystagmus.      Left eye: Normal extraocular motion and no nystagmus.      Pupils: Pupils are equal, round, and reactive to light.      Right eye: Pupil is reactive and not sluggish.      Left eye: Pupil is reactive and not sluggish.      Funduscopic exam:     Right eye: " No AV nicking, arteriolar narrowing or papilledema. Red reflex present.         Left eye: No AV nicking, arteriolar narrowing or papilledema. Red reflex present.  Neck:      Musculoskeletal: Normal range of motion. Normal range of motion. No neck rigidity, pain with movement, spinous process tenderness or muscular tenderness.      Vascular: No JVD.      Trachea: No tracheal deviation.   Cardiovascular:      Rate and Rhythm: Normal rate and regular rhythm.   Pulmonary:      Effort: Pulmonary effort is normal. No respiratory distress.      Breath sounds: Normal breath sounds. No stridor. No wheezing.   Abdominal:      General: There is no distension.      Palpations: There is no mass.      Tenderness: There is no abdominal tenderness. There is no right CVA tenderness, left CVA tenderness, guarding or rebound.   Musculoskeletal: Normal range of motion.         General: No tenderness or deformity.        Hands:       Comments: Left hand injury with a small puncture wound to the area in the webspace between the index and  thumb.  Erythema noted.  This was demarcated with skin marker and dated.    Minimal drainage from the wound.  Wound culture was obtained.   Lymphadenopathy:      Cervical: No cervical adenopathy.      Right cervical: No superficial cervical adenopathy.     Left cervical: No superficial cervical adenopathy.   Skin:     General: Skin is warm and dry.      Capillary Refill: Capillary refill takes less than 2 seconds.   Neurological:      Mental Status: He is alert and oriented to person, place, and time.      GCS: GCS eye subscore is 4. GCS verbal subscore is 5. GCS motor subscore is 6.      Motor: No tremor or seizure activity.      Coordination: Coordination normal.      Gait: Gait normal.         ED Course     Results for orders placed or performed during the hospital encounter of 02/01/20 (from the past 24 hour(s))   XR Hand Left G/E 3 Views    Narrative    PROCEDURE:  XR HAND LT G/E 3 VW    HISTORY:  possible FB    COMPARISON:  None.    TECHNIQUE:  3 views of the left hand were obtained.    FINDINGS:  There is a fracture of the terminal tuft of the fourth  finger. There is been amputation of the terminal aspect of the  terminal phalanx of the second finger.. The joint spaces are  preserved.  No radiopaque foreign bodies are seen in the soft tissues.        Impression    IMPRESSION: No radiopaque foreign bodies are seen in the soft tissues.  Fracture of the terminal phalanx of the fourth finger. Amputation of  the terminal tuft of the second finger.     SMOOTH AGUILAR MD   CBC with platelets differential   Result Value Ref Range    WBC 4.7 4.0 - 11.0 10e9/L    RBC Count 5.19 4.4 - 5.9 10e12/L    Hemoglobin 15.2 13.3 - 17.7 g/dL    Hematocrit 45.8 40.0 - 53.0 %    MCV 88 78 - 100 fl    MCH 29.3 26.5 - 33.0 pg    MCHC 33.2 31.5 - 36.5 g/dL    RDW 13.6 10.0 - 15.0 %    Platelet Count 221 150 - 450 10e9/L    Diff Method Automated Method     % Neutrophils 56.9 %    % Lymphocytes 30.6 %    % Monocytes 8.9 %    % Eosinophils 2.8 %    % Basophils 0.6 %    % Immature Granulocytes 0.2 %    Absolute Neutrophil 2.7 1.6 - 8.3 10e9/L    Absolute Lymphocytes 1.4 0.8 - 5.3 10e9/L    Absolute Monocytes 0.4 0.0 - 1.3 10e9/L    Absolute Eosinophils 0.1 0.0 - 0.7 10e9/L    Absolute Basophils 0.0 0.0 - 0.2 10e9/L    Abs Immature Granulocytes 0.0 0 - 0.4 10e9/L       Medications   cefTRIAXone (ROCEPHIN) 1 g in lidocaine (PF) (XYLOCAINE) 1 % injection (has no administration in time range)       Assessments & Plan (with Medical Decision Making)     I have reviewed the nursing notes.    I have reviewed the findings, diagnosis, plan and need for follow up with the patient.      New Prescriptions    HYDROCODONE-ACETAMINOPHEN (NORCO) 5-325 MG TABLET    Take 1 tablet by mouth every 6 hours as needed for moderate to severe pain    IBUPROFEN (ADVIL/MOTRIN) 800 MG TABLET    Take 1 tablet (800 mg) by mouth every 8 hours as needed for moderate  pain    SULFAMETHOXAZOLE-TRIMETHOPRIM (BACTRIM DS) 800-160 MG TABLET    Take 1 tablet by mouth 2 times daily for 10 days       Final diagnoses:   Crushing injury of finger of left hand - left ring finger distal phalanyx   Fracture of distal phalanx of left ring finger   Cellulitis of finger of left hand     Afebrile.  Vital signs stable.  Patient with crush injury to his left hand hitting it with a hammer yesterday.  Concerns for foreign body in the webspace of his left thumb and index finger.  He does indeed have erythema to this area and this was demarcated with a skin marker.  Wound culture was obtained from his puncture wound and is pending.  He is current on his tetanus.  Left hand x-rays show a distal tuft fracture to his left ring finger.  CBC shows normal white blood cells and no left shift.  Sed rate, CRP, and blood cultures are pending.  As well as wound culture.  We will treat empirically at this point he was given Rocephin IM in the ER.  Rx for 10-day course of Bactrim, Norco and Motrin.  Adult general surgery referral to  within 5 days for wound check and further evaluation.  Follow-up sooner if there is any other concerns problems or questions.  2/1/2020   [facility]     Stevo Goodwin PA-C  02/01/20 [id number]

## 2020-02-01 NOTE — ED TRIAGE NOTES
"Pt comes into the ER today ambulatory from home. Pt hit left hand with a hammer yesterday and was working with glass. Pt now has some redness and swelling to the left second finger/hand area. Small red area to palm that may have glass in the wound. Pt reports that it does \" pus up a little and bleed\"  "

## 2020-02-01 NOTE — ED AVS SNAPSHOT
[facility]  [address]  [place] [address]  Phone:  [phone]  Fax:  [phone]                                    Wellington Moreno   MRN: [id number]    Department:  [facility]   Date of Visit:  2/1/2020           After Visit Summary Signature Page    I have received my discharge instructions, and my questions have been answered. I have discussed any challenges I see with this plan with the nurse or doctor.    ..........................................................................................................................................  Patient/Patient Representative Signature      ..........................................................................................................................................  Patient Representative Print Name and Relationship to Patient    ..................................................               ................................................  Date                                   Time    ..........................................................................................................................................  Reviewed by Signature/Title    ...................................................              ..............................................  Date                                               Time          22EPIC Rev 08/18

## 2020-02-04 LAB
BACTERIA SPEC CULT: NORMAL
SPECIMEN SOURCE: NORMAL

## 2020-02-04 NOTE — RESULT ENCOUNTER NOTE
Final wound culture report is NEGATIVE.    No treatment or change in treatment per [place] ED Lab Result protocol.

## 2020-02-07 LAB
BACTERIA SPEC CULT: NORMAL
BACTERIA SPEC CULT: NORMAL
SPECIMEN SOURCE: NORMAL
SPECIMEN SOURCE: NORMAL

## 2020-02-21 RX ORDER — IBUPROFEN 800 MG/1
TABLET, FILM COATED ORAL
Qty: 60 TABLET | Refills: 0 | OUTPATIENT
Start: 2020-02-21

## 2020-02-21 NOTE — TELEPHONE ENCOUNTER
Disp Refills Start End CARLITOS   ibuprofen (ADVIL/MOTRIN) 800 MG tablet 60 tablet 0 2/1/2020  --   Sig - Route: Take 1 tablet (800 mg) by mouth every 8 hours as needed for moderate pain - Oral     Patient does not doctor here. Patient has been seen at [facility] for office visits recently. PCP listed is Jose Kiran. Last office visit with PCP was on 8/26/2019.    Called pharmacy and updated them. Let them know we would be denying the request. Delia Azul RN  ....................  2/21/2020   9:44 AM

## 2020-03-07 ENCOUNTER — OFFICE VISIT (OUTPATIENT)
Dept: FAMILY MEDICINE | Facility: OTHER | Age: 50
End: 2020-03-07
Attending: NURSE PRACTITIONER
Payer: COMMERCIAL

## 2020-03-07 VITALS
HEIGHT: 68 IN | RESPIRATION RATE: 16 BRPM | WEIGHT: 187.1 LBS | TEMPERATURE: 96.8 F | HEART RATE: 100 BPM | SYSTOLIC BLOOD PRESSURE: 145 MMHG | DIASTOLIC BLOOD PRESSURE: 92 MMHG | OXYGEN SATURATION: 96 % | BODY MASS INDEX: 28.36 KG/M2

## 2020-03-07 DIAGNOSIS — Z87.01 HISTORY OF BACTERIAL PNEUMONIA: Primary | ICD-10-CM

## 2020-03-07 DIAGNOSIS — K62.5 RECTAL BLEEDING: ICD-10-CM

## 2020-03-07 DIAGNOSIS — J06.9 UPPER RESPIRATORY TRACT INFECTION, UNSPECIFIED TYPE: ICD-10-CM

## 2020-03-07 DIAGNOSIS — K64.4 EXTERNAL HEMORRHOIDS: ICD-10-CM

## 2020-03-07 LAB
ALBUMIN SERPL-MCNC: 4.3 G/DL (ref 3.5–5.7)
ALP SERPL-CCNC: 74 U/L (ref 34–104)
ALT SERPL W P-5'-P-CCNC: 33 U/L (ref 7–52)
ANION GAP SERPL CALCULATED.3IONS-SCNC: 6 MMOL/L (ref 3–14)
APTT PPP: 37 SEC (ref 22–37)
AST SERPL W P-5'-P-CCNC: 42 U/L (ref 13–39)
BASOPHILS # BLD AUTO: 0 10E9/L (ref 0–0.2)
BASOPHILS NFR BLD AUTO: 0.6 %
BILIRUB SERPL-MCNC: 0.4 MG/DL (ref 0.3–1)
BUN SERPL-MCNC: 15 MG/DL (ref 7–25)
CALCIUM SERPL-MCNC: 9.4 MG/DL (ref 8.6–10.3)
CHLORIDE SERPL-SCNC: 102 MMOL/L (ref 98–107)
CO2 SERPL-SCNC: 30 MMOL/L (ref 21–31)
CREAT SERPL-MCNC: 1.02 MG/DL (ref 0.7–1.3)
DIFFERENTIAL METHOD BLD: NORMAL
EOSINOPHIL # BLD AUTO: 0.1 10E9/L (ref 0–0.7)
EOSINOPHIL NFR BLD AUTO: 2.6 %
ERYTHROCYTE [DISTWIDTH] IN BLOOD BY AUTOMATED COUNT: 13.5 % (ref 10–15)
GFR SERPL CREATININE-BSD FRML MDRD: 77 ML/MIN/{1.73_M2}
GLUCOSE SERPL-MCNC: 120 MG/DL (ref 70–105)
HCT VFR BLD AUTO: 48 % (ref 40–53)
HGB BLD-MCNC: 15.9 G/DL (ref 13.3–17.7)
IMM GRANULOCYTES # BLD: 0 10E9/L (ref 0–0.4)
IMM GRANULOCYTES NFR BLD: 0.4 %
INR PPP: 1.11 (ref 0–1.3)
LYMPHOCYTES # BLD AUTO: 1.6 10E9/L (ref 0.8–5.3)
LYMPHOCYTES NFR BLD AUTO: 31.7 %
MCH RBC QN AUTO: 29.1 PG (ref 26.5–33)
MCHC RBC AUTO-ENTMCNC: 33.1 G/DL (ref 31.5–36.5)
MCV RBC AUTO: 88 FL (ref 78–100)
MONOCYTES # BLD AUTO: 0.4 10E9/L (ref 0–1.3)
MONOCYTES NFR BLD AUTO: 7.5 %
NEUTROPHILS # BLD AUTO: 2.8 10E9/L (ref 1.6–8.3)
NEUTROPHILS NFR BLD AUTO: 57.2 %
PLATELET # BLD AUTO: 219 10E9/L (ref 150–450)
POTASSIUM SERPL-SCNC: 4.2 MMOL/L (ref 3.5–5.1)
PROT SERPL-MCNC: 7.2 G/DL (ref 6.4–8.9)
PROTHROMBIN TIME: 12.7 S (ref 11.9–15.2)
RBC # BLD AUTO: 5.46 10E12/L (ref 4.4–5.9)
SODIUM SERPL-SCNC: 138 MMOL/L (ref 134–144)
WBC # BLD AUTO: 4.9 10E9/L (ref 4–11)

## 2020-03-07 PROCEDURE — 80053 COMPREHEN METABOLIC PANEL: CPT | Mod: ZL | Performed by: PHYSICIAN ASSISTANT

## 2020-03-07 PROCEDURE — G0463 HOSPITAL OUTPT CLINIC VISIT: HCPCS

## 2020-03-07 PROCEDURE — 85610 PROTHROMBIN TIME: CPT | Mod: 91,ZL | Performed by: PHYSICIAN ASSISTANT

## 2020-03-07 PROCEDURE — 36415 COLL VENOUS BLD VENIPUNCTURE: CPT | Mod: ZL | Performed by: PHYSICIAN ASSISTANT

## 2020-03-07 PROCEDURE — 85730 THROMBOPLASTIN TIME PARTIAL: CPT | Mod: ZL | Performed by: PHYSICIAN ASSISTANT

## 2020-03-07 PROCEDURE — 99214 OFFICE O/P EST MOD 30 MIN: CPT | Performed by: PHYSICIAN ASSISTANT

## 2020-03-07 PROCEDURE — 85025 COMPLETE CBC W/AUTO DIFF WBC: CPT | Mod: ZL | Performed by: PHYSICIAN ASSISTANT

## 2020-03-07 RX ORDER — DEXTROAMPHETAMINE SACCHARATE, AMPHETAMINE ASPARTATE MONOHYDRATE, DEXTROAMPHETAMINE SULFATE AND AMPHETAMINE SULFATE 3.75; 3.75; 3.75; 3.75 MG/1; MG/1; MG/1; MG/1
CAPSULE, EXTENDED RELEASE ORAL
COMMUNITY
Start: 2020-02-26

## 2020-03-07 RX ORDER — GABAPENTIN 300 MG/1
CAPSULE ORAL
COMMUNITY
Start: 2019-11-01 | End: 2022-06-20

## 2020-03-07 RX ORDER — ALBUTEROL SULFATE 90 UG/1
1-2 AEROSOL, METERED RESPIRATORY (INHALATION)
COMMUNITY
Start: 2020-01-09 | End: 2022-06-20

## 2020-03-07 RX ORDER — TRAZODONE HYDROCHLORIDE 50 MG/1
0.5 TABLET, FILM COATED ORAL DAILY
COMMUNITY
Start: 2020-02-26 | End: 2022-06-20

## 2020-03-07 RX ORDER — AZITHROMYCIN 250 MG/1
TABLET, FILM COATED ORAL
Qty: 6 TABLET | Refills: 0 | Status: SHIPPED | OUTPATIENT
Start: 2020-03-07 | End: 2020-05-19

## 2020-03-07 SDOH — HEALTH STABILITY: MENTAL HEALTH: HOW OFTEN DO YOU HAVE A DRINK CONTAINING ALCOHOL?: 2-4 TIMES A MONTH

## 2020-03-07 ASSESSMENT — ENCOUNTER SYMPTOMS
LIGHT-HEADEDNESS: 0
BLOOD IN STOOL: 1
JOINT SWELLING: 0
SORE THROAT: 0
VOMITING: 0
COUGH: 1
DIFFICULTY URINATING: 0
ARTHRALGIAS: 0
FEVER: 0
APPETITE CHANGE: 0
NAUSEA: 0
WHEEZING: 0
ABDOMINAL PAIN: 0
ACTIVITY CHANGE: 0
CHEST TIGHTNESS: 0
DIZZINESS: 0
SHORTNESS OF BREATH: 0
RECTAL PAIN: 0
DIARRHEA: 0
ADENOPATHY: 0
MYALGIAS: 0
SINUS PAIN: 0
SINUS PRESSURE: 0
FATIGUE: 0

## 2020-03-07 ASSESSMENT — PAIN SCALES - GENERAL: PAINLEVEL: NO PAIN (0)

## 2020-03-07 ASSESSMENT — MIFFLIN-ST. JEOR: SCORE: 1683.18

## 2020-03-07 NOTE — PATIENT INSTRUCTIONS
Patient Education     Understanding Hemorrhoids    Hemorrhoid tissues are  cushions  of blood vessels that swell slightly during bowel movements. Too much pressure on the anal canal can make these tissues remain enlarged, become inflamed, and cause symptoms. This can happen both inside and outside the anal canal.  Parts of the anal canal  The parts of the anal canal are:    Internal hemorrhoid tissue is in the upper area of the anal canal.    The rectum is the last several inches of the colon. This is where stool is stored prior to bowel movements.    Anal sphincters are ring-shaped muscles that expand and contract to control the anal opening.    External hemorrhoid tissue lies under the anal skin.    The anus is the passage between the rectum and the outside of the body.  Normal hemorrhoid tissue  Hemorrhoid tissues play an important role in helping your body eliminate waste. Food passes from the stomach through the intestines. The waste (stool) then travels through the colon to the rectum. It is stored in the rectum until it s ready to be passed from the anus. During bowel movements, hemorrhoids swell with blood and become slightly larger. This swelling helps protect and cushion the anal canal as stool passes from the body. Once the stool has passed, the tissues stop swelling and return to normal.  Problem hemorrhoids  Pressure due to straining or other factors can cause hemorrhoid tissues to remain swollen. When this happens to the hemorrhoid tissues in the anal canal they re called internal hemorrhoids. Swollen tissues around the anal opening are called external hemorrhoids. Depending on the location, your symptoms can differ.    Internal hemorrhoids often happen in clusters around the wall of the anal canal. They are usually painless. But they may prolapse (protrude out of the anus) due to straining or pressure from hard stool. After the bowel movement is over, they may then reduce (return inside the body).  "Internal hemorrhoids often bleed. They can also discharge mucus.      External hemorrhoids are located at the anal opening, just beneath the skin. These tissues rarely cause problems unless they thrombose (form a blood clot). When this happens, a hard, bluish lump may appear. A thrombosed hemorrhoid also causes sudden, severe pain. In time, the clot may go away on its own. This sometimes leaves a  skin tag  of tissue stretched by the clot.  Hemorrhoid symptoms  Hemorrhoid symptoms may include:    Pain or a burning sensation    Bleeding during bowel movements    Protrusion of tissue from the anus    Itching around the anus  Causes of hemorrhoids  There s no single cause of hemorrhoids. Most often, though, they are caused by too much pressure on the anal canal. This can be due to:    Chronic (ongoing) constipation    Straining during bowel movements    Sitting too long on the toilet    Strenuous exercise or heavy lifting    Pregnancy and childbirth    Aging    Diarrhea  Date Last Reviewed: 7/1/2016 2000-2019 [employer]. [address] [place] [address]. All rights reserved. This information is not intended as a substitute for professional medical care. Always follow your healthcare professional's instructions.         Wellington, your rectal bleeding is due to a hemorrhoid.  The blood from the nose and in your sputum are probably caused from \"microtrauma\" from coughing so hard.  Your blood tests (PT/PTT, CBC, CMP) were reassuring.  Since you did have laboratory confirmed pneumonia not that long ago, I would recommend that your antibiotic be switched back to Zithromax for another five day course. Stop the Bactrim (trimethoprim-sulfamethoxazole).  Make sure you are drinking plenty of water. I recommend Delsym or generic equivalent for cough at bedtime. It is a 12 hour medication.  If you have worsening symptoms or any other concerns, please get re-evaluated immediately.            Patient Education "     When You Have Pneumonia  You have been diagnosed with pneumonia. This is a serious lung infection. Most cases of pneumonia are caused by bacteria. Pneumonia most often occurs in older adults, young children, and people with chronic health problems.  Home care    Take your medicine exactly as directed. Don t skip doses. Continue taking your antibiotics as until they are all gone, even if you start to feel better. This will prevent the pneumonia from coming back.    Drink at least 8 glasses of water daily, unless directed otherwise. This helps to loosen and thin secretions so that you can cough them up.    Use a cool-mist humidifier in your bedroom. Be sure to clean the humidifier daily.    Don t use medicines to suppress your cough unless your cough is dry, painful, or interferes with your sleep. Coughing up mucus is normal. You may use an expectorant if your healthcare provider says it s okay.    You can use warm compresses or a heating pad on the lowest setting to relieve chest discomfort. Use several times a day for 15-20 minutes at a time. To prevent injury to your skin, set the temperature to warm, not hot. Don t put the compress or pad directly on your skin. Make certain it has a cover or wrap it in a towel. This is to prevent skin burns.    Get plenty of rest until your fever, shortness of breath, and chest pain go away.    Plan to get a flu shot every year. The flu is a common cause of pneumonia. Getting a flu shot every year can help prevent both the flu and pneumonia.  Getting the pneumococcal vaccine  Talk with your healthcare provider about getting the pneumococcal vaccine. Pneumococcal pneumonia is caused by bacteria that spread from person to person. It can cause minor problems, such as ear infections. But it can also turn into life-threatening illnesses of the lungs (pneumonia), the covering of the brain and spinal cord (meningitis), and the blood (bacteremia).  Children under 2 years of age,  adults over age 65, people with certain health conditions, and smokers are at the highest risk of pneumococcal disease. This vaccine can help prevent pneumococcal disease in both adults and children. Some people should not have the vaccine. Make sure to ask your healthcare provider if you should have the vaccine.   Follow-up care  Make a follow-up appointment as directed by our staff.  When to call your healthcare provider  Call your healthcare provider right away if you have any of the following:    Fever of 100.4 F (38 C) or higher, or as directed by your healthcare provider    Mucus from the lungs (sputum) that s yellow, green, bloody, or smells bad    A large amount of sputum    Vomiting    Symptoms that get worse  Call 911  Call 911 right away if you have any of the following:    Chest pain    Trouble breathing    Blue lips or fingernails   Date Last Reviewed: 11/1/2016 2000-2019 [employer]. [address] [place] [address]. All rights reserved. This information is not intended as a substitute for professional medical care. Always follow your healthcare professional's instructions.

## 2020-03-07 NOTE — NURSING NOTE
Chief Complaint   Patient presents with     Cough       Medication Reconciliation complete.   Kasey Coleman LPN  ..................3/7/2020   11:56 AM   COUGH/CONGESTION  Onset:  2 weeks  Fever:  no  Productive:  yes  Color:  Blood and dark yellow  Shortness of breath:  no  Chills:  no  Pain scale:    0  Activity Level:  normal  Appetite:  normal  Able to sleep:  yes  Patient went to have a bowel movement 1/5 weeks ago and had blood in stool. Patient had again today. Patient started a new sleeping pill 3 days ago.  Patient was supposed to take bacrim in February but did not start them. Patient started them 3 days ago  Kasey Coleman LPN .............3/7/2020  11:56 AM

## 2020-03-07 NOTE — PROGRESS NOTES
SUBJECTIVE:   Wellington Moreno is a 49 year old male presenting with a chief complaint of   Chief Complaint   Patient presents with     Cough     Nursing Notes:   Kasey Coelman LPN  3/7/2020 12:25 PM  Signed  Chief Complaint   Patient presents with     Cough       Medication Reconciliation complete.   Kasey Coleman LPN  ..................3/7/2020   11:56 AM   COUGH/CONGESTION  Onset:  2 weeks  Fever:  no  Productive:  yes  Color:  Blood and dark yellow  Shortness of breath:  no  Chills:  no  Pain scale:    0  Activity Level:  normal  Appetite:  normal  Able to sleep:  yes  Patient went to have a bowel movement 1/5 weeks ago and had blood in stool. Patient had again today. Patient started a new sleeping pill 3 days ago.  Patient was supposed to take bacrim in February but did not start them. Patient started them 3 days ago  Kasey Coleman LPN .............3/7/2020  11:56 AM      HPI:  Wellington presents to [facility] with multiple concerns.  He is primarily here for a cough for the last 2 weeks. He was prescribed Bactrim at [facility] but did not start that medication until 3 days ago.  He feels like his cough is slightly better since he started taking the antibiotic.  He is coughing up mucus, dark yellow throughout the day with occasional small amounts of blood.  No sinus pain or pressure.  He does not feel short of breath. He had a bloody nose this morning too-- not running out the nose, but in the mucus.   From chart review and patient's history, it seems that he was diagnosed with chlamydial pneumonia about 6 weeks ago and treated with a ZPAK.  He got better and then his symptoms returned again so he was given Bactrim. A follow up chest xray at outside facility was clear but patient states they gave him Bactrim just in case.     He is on two other new medications. He started trazodone 3-4 days ago and adderall a week ago.      He states almost 2 weeks ago, he had blood in the stool. At  that time, he thought maybe it was a hemorrhoid so was not really worried about that.  He has bowel movements every 3-4 days and this is normal for him. Now today, he had bright red blood in the stool again after a bowel movement, seemed like a large amount that colored the toilet water, and since he also had the blood in his sputum, it raised his concern level.  The stool was somewhat hard today but the bowel movement was not painful. Colonoscopy is up to date-- done Sept 2019 with tubular adenoma finding, recommended 5 year follow up.     His regular provider is Jerri Rodrigez NP at [facility].      Review of Systems   Constitutional: Negative for activity change, appetite change, fatigue and fever.   HENT: Positive for congestion. Negative for ear discharge, ear pain, sinus pressure, sinus pain and sore throat.    Respiratory: Positive for cough. Negative for chest tightness, shortness of breath and wheezing.    Cardiovascular: Negative for chest pain.   Gastrointestinal: Positive for blood in stool. Negative for abdominal pain, diarrhea, nausea, rectal pain and vomiting.   Genitourinary: Negative for difficulty urinating.   Musculoskeletal: Negative for arthralgias, joint swelling and myalgias.   Skin: Negative for pallor and rash.        No unusual bruising.    Neurological: Negative for dizziness and light-headedness.   Hematological: Negative for adenopathy.       No past medical history on file.  No family history on file.  Current Outpatient Medications   Medication Sig Dispense Refill     albuterol (PROAIR HFA/PROVENTIL HFA/VENTOLIN HFA) 108 (90 Base) MCG/ACT inhaler Inhale 1-2 puffs into the lungs               fluticasone (FLONASE) 50 MCG/ACT nasal spray        pantoprazole (PROTONIX) 40 MG EC tablet Take 40 mg by mouth       traZODone (DESYREL) 50 MG tablet Take 0.5 tablets by mouth daily       venlafaxine (EFFEXOR-XR) 150 MG 24 hr capsule Take 225 mg by mouth daily        "amphetamine-dextroamphetamine (ADDERALL XR) 15 MG 24 hr capsule        gabapentin (NEURONTIN) 300 MG capsule        ibuprofen (ADVIL/MOTRIN) 800 MG tablet Take 1 tablet (800 mg) by mouth every 8 hours as needed for moderate pain 60 tablet 0     olopatadine (PATANOL) 0.1 % ophthalmic solution Place 1 drop into both eyes 2 times daily          Allergies   Allergen Reactions     Dogs      Pollen Extract      Trees, flowers grass       Social History     Tobacco Use     Smoking status: Never Smoker     Smokeless tobacco: Never Used     Tobacco comment: no ecig   Substance Use Topics     Alcohol use: Yes     Frequency: 2-4 times a month       OBJECTIVE  BP (!) 145/92 (BP Location: Left arm, Patient Position: Sitting, Cuff Size: Adult Regular)   Pulse 100   Temp 96.8  F (36  C) (Tympanic)   Resp 16   Ht 1.727 m (5' 8\")   Wt 84.9 kg (187 lb 1.6 oz)   SpO2 96%   BMI 28.45 kg/m      Physical Exam  Constitutional:       General: He is not in acute distress.     Appearance: Normal appearance. He is not ill-appearing or toxic-appearing.   HENT:      Right Ear: Tympanic membrane, ear canal and external ear normal.      Left Ear: Tympanic membrane, ear canal and external ear normal.      Nose: Congestion present. No rhinorrhea.      Mouth/Throat:      Mouth: Mucous membranes are moist.      Pharynx: Oropharynx is clear. No oropharyngeal exudate or posterior oropharyngeal erythema.   Eyes:      General:         Right eye: No discharge.      Conjunctiva/sclera: Conjunctivae normal.      Pupils: Pupils are equal, round, and reactive to light.   Neck:      Musculoskeletal: Normal range of motion.   Cardiovascular:      Rate and Rhythm: Normal rate and regular rhythm.      Heart sounds: No murmur.   Pulmonary:      Effort: Pulmonary effort is normal. No respiratory distress.      Breath sounds: Normal breath sounds. No wheezing.      Comments: Cough is harsh, frequent and loose.   Chest:      Chest wall: No tenderness. "   Abdominal:      General: Bowel sounds are normal. There is no distension.      Tenderness: There is no abdominal tenderness. There is no guarding or rebound.   Musculoskeletal:      Right lower leg: No edema.      Left lower leg: No edema.   Lymphadenopathy:      Cervical: No cervical adenopathy.   Skin:     General: Skin is warm and dry.      Findings: No rash.   Neurological:      Mental Status: He is alert.         Labs:  Results for orders placed or performed in visit on 03/07/20 (from the past 24 hour(s))   Comprehensive Metabolic Panel   Result Value Ref Range    Sodium 138 134 - 144 mmol/L    Potassium 4.2 3.5 - 5.1 mmol/L    Chloride 102 98 - 107 mmol/L    Carbon Dioxide 30 21 - 31 mmol/L    Anion Gap 6 3 - 14 mmol/L    Glucose 120 (H) 70 - 105 mg/dL    Urea Nitrogen 15 7 - 25 mg/dL    Creatinine 1.02 0.70 - 1.30 mg/dL    GFR Estimate 77 >60 mL/min/[1.73_m2]    GFR Estimate If Black >90 >60 mL/min/[1.73_m2]    Calcium 9.4 8.6 - 10.3 mg/dL    Bilirubin Total 0.4 0.3 - 1.0 mg/dL    Albumin 4.3 3.5 - 5.7 g/dL    Protein Total 7.2 6.4 - 8.9 g/dL    Alkaline Phosphatase 74 34 - 104 U/L    ALT 33 7 - 52 U/L    AST 42 (H) 13 - 39 U/L   Protime-INR   Result Value Ref Range    Protime 12.7 11.9 - 15.2 s   APTT   Result Value Ref Range    PTT 37 22 - 37 sec   CBC and Differential   Result Value Ref Range    WBC 4.9 4.0 - 11.0 10e9/L    RBC Count 5.46 4.4 - 5.9 10e12/L    Hemoglobin 15.9 13.3 - 17.7 g/dL    Hematocrit 48.0 40.0 - 53.0 %    MCV 88 78 - 100 fl    MCH 29.1 26.5 - 33.0 pg    MCHC 33.1 31.5 - 36.5 g/dL    RDW 13.5 10.0 - 15.0 %    Platelet Count 219 150 - 450 10e9/L    Diff Method Automated Method     % Neutrophils 57.2 %    % Lymphocytes 31.7 %    % Monocytes 7.5 %    % Eosinophils 2.6 %    % Basophils 0.6 %    % Immature Granulocytes 0.4 %    Absolute Neutrophil 2.8 1.6 - 8.3 10e9/L    Absolute Lymphocytes 1.6 0.8 - 5.3 10e9/L    Absolute Monocytes 0.4 0.0 - 1.3 10e9/L    Absolute Eosinophils 0.1 0.0 -  0.7 10e9/L    Absolute Basophils 0.0 0.0 - 0.2 10e9/L    Abs Immature Granulocytes 0.0 0 - 0.4 10e9/L   INR   Result Value Ref Range    INR 1.11 0 - 1.3         ASSESSMENT:      ICD-10-CM    1. History of bacterial pneumonia Z87.01 azithromycin (ZITHROMAX) 250 MG tablet     INR   2. Upper respiratory tract infection, unspecified type J06.9 azithromycin (ZITHROMAX) 250 MG tablet   3. External hemorrhoids K64.4    4. Rectal bleeding K62.5 Comprehensive Metabolic Panel     Protime-INR     APTT     CBC and Differential     Comprehensive Metabolic Panel     Protime-INR     APTT     CBC and Differential        PLAN:  Patient was reassured in regard to the rectal bleeding and blood in sputum. Lab is reassuring.  Increase oral fluids and high fiber foods to combat constipation.  Try to take frequent breaks from sitting.  Use OTC hemorrhoid meds such as phenylephrine or TUCKs if needed for discomfort.  Since he had lab confirmed chlamydial pneumonia, would recommend with this return of respiratory illness, that he stop the Bactrim and use ZPAK. Could be viral as well but not improving now x 2 weeks of symptoms.  If he gets difficulty breathing, fever or other concerns, return for immediate reevaluation, to ER if needed.  Otherwise, plan follow up with his primary care provider in one week. He understands and agrees with this plan. Marilu Hamilton PA-C on 3/7/2020 at 6:49 PM        Patient Instructions     Patient Education     Understanding Hemorrhoids    Hemorrhoid tissues are  cushions  of blood vessels that swell slightly during bowel movements. Too much pressure on the anal canal can make these tissues remain enlarged, become inflamed, and cause symptoms. This can happen both inside and outside the anal canal.  Parts of the anal canal  The parts of the anal canal are:    Internal hemorrhoid tissue is in the upper area of the anal canal.    The rectum is the last several inches of the colon. This is where stool is stored  prior to bowel movements.    Anal sphincters are ring-shaped muscles that expand and contract to control the anal opening.    External hemorrhoid tissue lies under the anal skin.    The anus is the passage between the rectum and the outside of the body.  Normal hemorrhoid tissue  Hemorrhoid tissues play an important role in helping your body eliminate waste. Food passes from the stomach through the intestines. The waste (stool) then travels through the colon to the rectum. It is stored in the rectum until it s ready to be passed from the anus. During bowel movements, hemorrhoids swell with blood and become slightly larger. This swelling helps protect and cushion the anal canal as stool passes from the body. Once the stool has passed, the tissues stop swelling and return to normal.  Problem hemorrhoids  Pressure due to straining or other factors can cause hemorrhoid tissues to remain swollen. When this happens to the hemorrhoid tissues in the anal canal they re called internal hemorrhoids. Swollen tissues around the anal opening are called external hemorrhoids. Depending on the location, your symptoms can differ.    Internal hemorrhoids often happen in clusters around the wall of the anal canal. They are usually painless. But they may prolapse (protrude out of the anus) due to straining or pressure from hard stool. After the bowel movement is over, they may then reduce (return inside the body). Internal hemorrhoids often bleed. They can also discharge mucus.      External hemorrhoids are located at the anal opening, just beneath the skin. These tissues rarely cause problems unless they thrombose (form a blood clot). When this happens, a hard, bluish lump may appear. A thrombosed hemorrhoid also causes sudden, severe pain. In time, the clot may go away on its own. This sometimes leaves a  skin tag  of tissue stretched by the clot.  Hemorrhoid symptoms  Hemorrhoid symptoms may include:    Pain or a burning  "sensation    Bleeding during bowel movements    Protrusion of tissue from the anus    Itching around the anus  Causes of hemorrhoids  There s no single cause of hemorrhoids. Most often, though, they are caused by too much pressure on the anal canal. This can be due to:    Chronic (ongoing) constipation    Straining during bowel movements    Sitting too long on the toilet    Strenuous exercise or heavy lifting    Pregnancy and childbirth    Aging    Diarrhea  Date Last Reviewed: 7/1/2016 2000-2019 [employer]. [address] [place] [address]. All rights reserved. This information is not intended as a substitute for professional medical care. Always follow your healthcare professional's instructions.         Wellington, your rectal bleeding is due to a hemorrhoid.  The blood from the nose and in your sputum are probably caused from \"microtrauma\" from coughing so hard.  Your blood tests (PT/PTT, CBC, CMP) were reassuring.  Since you did have laboratory confirmed pneumonia not that long ago, I would recommend that your antibiotic be switched back to Zithromax for another five day course. Stop the Bactrim (trimethoprim-sulfamethoxazole).  Make sure you are drinking plenty of water. I recommend Delsym or generic equivalent for cough at bedtime. It is a 12 hour medication.  If you have worsening symptoms or any other concerns, please get re-evaluated immediately.            Patient Education     When You Have Pneumonia  You have been diagnosed with pneumonia. This is a serious lung infection. Most cases of pneumonia are caused by bacteria. Pneumonia most often occurs in older adults, young children, and people with chronic health problems.  Home care    Take your medicine exactly as directed. Don t skip doses. Continue taking your antibiotics as until they are all gone, even if you start to feel better. This will prevent the pneumonia from coming back.    Drink at least 8 glasses of water daily, unless " directed otherwise. This helps to loosen and thin secretions so that you can cough them up.    Use a cool-mist humidifier in your bedroom. Be sure to clean the humidifier daily.    Don t use medicines to suppress your cough unless your cough is dry, painful, or interferes with your sleep. Coughing up mucus is normal. You may use an expectorant if your healthcare provider says it s okay.    You can use warm compresses or a heating pad on the lowest setting to relieve chest discomfort. Use several times a day for 15-20 minutes at a time. To prevent injury to your skin, set the temperature to warm, not hot. Don t put the compress or pad directly on your skin. Make certain it has a cover or wrap it in a towel. This is to prevent skin burns.    Get plenty of rest until your fever, shortness of breath, and chest pain go away.    Plan to get a flu shot every year. The flu is a common cause of pneumonia. Getting a flu shot every year can help prevent both the flu and pneumonia.  Getting the pneumococcal vaccine  Talk with your healthcare provider about getting the pneumococcal vaccine. Pneumococcal pneumonia is caused by bacteria that spread from person to person. It can cause minor problems, such as ear infections. But it can also turn into life-threatening illnesses of the lungs (pneumonia), the covering of the brain and spinal cord (meningitis), and the blood (bacteremia).  Children under 2 years of age, adults over age 65, people with certain health conditions, and smokers are at the highest risk of pneumococcal disease. This vaccine can help prevent pneumococcal disease in both adults and children. Some people should not have the vaccine. Make sure to ask your healthcare provider if you should have the vaccine.   Follow-up care  Make a follow-up appointment as directed by our staff.  When to call your healthcare provider  Call your healthcare provider right away if you have any of the following:    Fever of 100.4 F  (38 C) or higher, or as directed by your healthcare provider    Mucus from the lungs (sputum) that s yellow, green, bloody, or smells bad    A large amount of sputum    Vomiting    Symptoms that get worse  Call 911  Call 911 right away if you have any of the following:    Chest pain    Trouble breathing    Blue lips or fingernails   Date Last Reviewed: 11/1/2016 2000-2019 [employer]. [address] [place] [address], [place] [address]. All rights reserved. This information is not intended as a substitute for professional medical care. Always follow your healthcare professional's instructions.

## 2020-03-11 ENCOUNTER — HEALTH MAINTENANCE LETTER (OUTPATIENT)
Age: 50
End: 2020-03-11

## 2020-03-20 ENCOUNTER — HOSPITAL ENCOUNTER (EMERGENCY)
Facility: OTHER | Age: 50
Discharge: HOME | End: 2020-03-20
Attending: EMERGENCY MEDICINE | Admitting: EMERGENCY MEDICINE
Payer: COMMERCIAL

## 2020-03-20 VITALS
WEIGHT: 180 LBS | HEIGHT: 68 IN | OXYGEN SATURATION: 98 % | BODY MASS INDEX: 27.28 KG/M2 | SYSTOLIC BLOOD PRESSURE: 119 MMHG | RESPIRATION RATE: 16 BRPM | DIASTOLIC BLOOD PRESSURE: 81 MMHG | TEMPERATURE: 97.4 F

## 2020-03-20 DIAGNOSIS — L03.119 CELLULITIS AND ABSCESS OF LEG: ICD-10-CM

## 2020-03-20 DIAGNOSIS — L02.419 CELLULITIS AND ABSCESS OF LEG: ICD-10-CM

## 2020-03-20 PROBLEM — Z00.00 HEALTHCARE MAINTENANCE: Status: ACTIVE | Noted: 2020-03-20

## 2020-03-20 PROBLEM — F51.01 PRIMARY INSOMNIA: Status: ACTIVE | Noted: 2019-08-17

## 2020-03-20 PROBLEM — D12.6 TUBULAR ADENOMA OF COLON: Status: ACTIVE | Noted: 2019-09-21

## 2020-03-20 PROBLEM — Z91.09 ENVIRONMENTAL ALLERGIES: Status: ACTIVE | Noted: 2019-04-11

## 2020-03-20 PROBLEM — M25.532 LEFT WRIST PAIN: Status: ACTIVE | Noted: 2019-08-17

## 2020-03-20 PROBLEM — L71.9 ROSACEA: Status: ACTIVE | Noted: 2020-03-20

## 2020-03-20 PROBLEM — H91.93 DECREASED HEARING OF BOTH EARS: Status: ACTIVE | Noted: 2020-02-28

## 2020-03-20 PROBLEM — F33.42 MAJOR DEPRESSIVE DISORDER, RECURRENT, IN FULL REMISSION (H): Status: ACTIVE | Noted: 2020-03-20

## 2020-03-20 PROBLEM — F41.9 ANXIETY: Status: ACTIVE | Noted: 2019-08-17

## 2020-03-20 PROBLEM — E78.5 HYPERLIPIDEMIA: Status: ACTIVE | Noted: 2020-03-20

## 2020-03-20 PROBLEM — S49.92XA INJURY OF LEFT SHOULDER: Status: ACTIVE | Noted: 2018-02-22

## 2020-03-20 PROBLEM — F33.2 SEVERE EPISODE OF RECURRENT MAJOR DEPRESSIVE DISORDER, WITHOUT PSYCHOTIC FEATURES (H): Status: ACTIVE | Noted: 2019-08-17

## 2020-03-20 PROBLEM — G47.33 OSA (OBSTRUCTIVE SLEEP APNEA): Status: ACTIVE | Noted: 2019-06-05

## 2020-03-20 PROBLEM — J30.9 ALLERGIC RHINITIS: Status: ACTIVE | Noted: 2019-09-12

## 2020-03-20 PROBLEM — D17.0 LIPOMA OF SCALP: Status: ACTIVE | Noted: 2019-12-07

## 2020-03-20 PROBLEM — G25.81 RESTLESS LEG SYNDROME: Status: ACTIVE | Noted: 2019-08-17

## 2020-03-20 PROBLEM — K21.9 ESOPHAGEAL REFLUX: Status: ACTIVE | Noted: 2020-03-20

## 2020-03-20 PROBLEM — K21.00 GASTROESOPHAGEAL REFLUX DISEASE WITH ESOPHAGITIS: Status: ACTIVE | Noted: 2019-06-05

## 2020-03-20 PROCEDURE — 99282 EMERGENCY DEPT VISIT SF MDM: CPT | Performed by: EMERGENCY MEDICINE

## 2020-03-20 PROCEDURE — 99283 EMERGENCY DEPT VISIT LOW MDM: CPT | Mod: Z6 | Performed by: EMERGENCY MEDICINE

## 2020-03-20 RX ORDER — SULFAMETHOXAZOLE/TRIMETHOPRIM 800-160 MG
1 TABLET ORAL 2 TIMES DAILY
Qty: 20 TABLET | Refills: 0 | Status: SHIPPED | OUTPATIENT
Start: 2020-03-20 | End: 2020-05-19

## 2020-03-20 ASSESSMENT — ENCOUNTER SYMPTOMS
SHORTNESS OF BREATH: 0
VOMITING: 0
CHEST TIGHTNESS: 0
ARTHRALGIAS: 1
CHILLS: 0
NAUSEA: 0
LIGHT-HEADEDNESS: 0
DYSURIA: 0
COLOR CHANGE: 1
AGITATION: 0
FEVER: 0

## 2020-03-20 ASSESSMENT — MIFFLIN-ST. JEOR: SCORE: 1650.97

## 2020-03-20 NOTE — DISCHARGE INSTRUCTIONS
Return if your leg pain and swelling is getting worse, particularly if you start getting swelling or pain in your calf or any shortness of breath or chest pain.

## 2020-03-20 NOTE — ED PROVIDER NOTES
History     Chief Complaint   Patient presents with     Leg Pain     HPI  Wellington Moreno is a 49 year old male who has noticed a red spot and some swelling on the right lower leg and foot.  It began with just a little lump in his anterior lower extremity.  It is now progressed to the point where it is somewhat red and itchy.  He has swelling below this on his ankle.  No pain or swelling in his calf or anywhere in his posterior lower extremity.  Denies shortness of breath or chest pain.    Allergies:  Allergies   Allergen Reactions     Dogs      Pollen Extract      Trees, flowers grass       Problem List:    Patient Active Problem List    Diagnosis Date Noted     Esophageal reflux 03/20/2020     Priority: Medium     Healthcare maintenance 03/20/2020     Priority: Medium     Hyperlipidemia 03/20/2020     Priority: Medium     Major depressive disorder, recurrent, in full remission (H) 03/20/2020     Priority: Medium     Rosacea 03/20/2020     Priority: Medium     Decreased hearing of both ears 02/28/2020     Priority: Medium     Lipoma of scalp 12/07/2019     Priority: Medium     Tubular adenoma of colon 09/21/2019     Priority: Medium     Removed in colonoscopy with Dr. Newsome:  Specimen: Large Intestine Right/Ascending Colon, Ascending colon polyp Final Dx   Colon, ascending, polypectomy:   - Tubular adenoma.   - Negative for high grade dysplasia       Allergic rhinitis 09/12/2019     Priority: Medium     Anxiety 08/17/2019     Priority: Medium     Left wrist pain 08/17/2019     Priority: Medium     Primary insomnia 08/17/2019     Priority: Medium     Restless leg syndrome 08/17/2019     Priority: Medium     Severe episode of recurrent major depressive disorder, without psychotic features (H) 08/17/2019     Priority: Medium     Gastroesophageal reflux disease with esophagitis 06/05/2019     Priority: Medium     ZHANG (obstructive sleep apnea) 06/05/2019     Priority: Medium     Overview:   Sleep study around 2012,  intolerant of CPAP   Sleep study around 2012, intolerant of CPAP       Environmental allergies 04/11/2019     Priority: Medium     Injury of left shoulder 02/22/2018     Priority: Medium     Adhesive capsulitis 05/16/2013     Priority: Medium     Shoulder impingement 03/30/2012     Priority: Medium     Scapular dyskinesis 01/04/2012     Priority: Medium        Past Medical History:    History reviewed. No pertinent past medical history.    Past Surgical History:    History reviewed. No pertinent surgical history.    Family History:    History reviewed. No pertinent family history.    Social History:  Marital Status:  Single [1]  Social History     Tobacco Use     Smoking status: Never Smoker     Smokeless tobacco: Never Used     Tobacco comment: no ecig   Substance Use Topics     Alcohol use: Yes     Frequency: 2-4 times a month     Drug use: Yes     Types: Marijuana        Medications:    albuterol (PROAIR HFA/PROVENTIL HFA/VENTOLIN HFA) 108 (90 Base) MCG/ACT inhaler  amphetamine-dextroamphetamine (ADDERALL XR) 15 MG 24 hr capsule  gabapentin (NEURONTIN) 300 MG capsule  ibuprofen (ADVIL/MOTRIN) 800 MG tablet  olopatadine (PATANOL) 0.1 % ophthalmic solution  pantoprazole (PROTONIX) 40 MG EC tablet  sulfamethoxazole-trimethoprim (BACTRIM DS) 800-160 MG tablet  traZODone (DESYREL) 50 MG tablet  venlafaxine (EFFEXOR-XR) 150 MG 24 hr capsule  fluticasone (FLONASE) 50 MCG/ACT nasal spray          Review of Systems   Constitutional: Negative for chills and fever.   HENT: Negative for congestion.    Eyes: Negative for visual disturbance.   Respiratory: Negative for chest tightness and shortness of breath.    Cardiovascular: Negative for chest pain.   Gastrointestinal: Negative for nausea and vomiting.   Genitourinary: Negative for dysuria.   Musculoskeletal: Positive for arthralgias.   Skin: Positive for color change. Negative for rash.   Neurological: Negative for light-headedness.   Psychiatric/Behavioral: Negative for  "agitation.       Physical Exam   BP: 119/81  Temp: 97.4  F (36.3  C)  Resp: 16  Height: 172.7 cm (5' 8\")  Weight: 81.6 kg (180 lb)  SpO2: 98 %      Physical Exam  Vitals signs and nursing note reviewed.   Constitutional:       Appearance: Normal appearance.   HENT:      Head: Normocephalic and atraumatic.      Mouth/Throat:      Mouth: Mucous membranes are moist.   Eyes:      Conjunctiva/sclera: Conjunctivae normal.   Cardiovascular:      Rate and Rhythm: Normal rate.   Pulmonary:      Effort: Pulmonary effort is normal.   Musculoskeletal:      Comments: He does have some erythema lower anterior right lower extremity.  Minimally warm to touch.  Not painful to touch but it does cause it to feel little bit itchy.  He does have a little bit of pitting edema anteriorly below this.  The calf is supple and nontender.   Skin:     General: Skin is warm and dry.   Neurological:      Mental Status: He is alert and oriented to person, place, and time.   Psychiatric:         Mood and Affect: Mood normal.         Behavior: Behavior normal.         ED Course        Procedures               No results found for this or any previous visit (from the past 24 hour(s)).    Medications - No data to display    Assessments & Plan (with Medical Decision Making)     I have reviewed the nursing notes.    I have reviewed the findings, diagnosis, plan and need for follow up with the patient.  With anterior symptoms which are slightly warm and slightly tender this may represent an early cellulitis.  Will try some Bactrim.  Also hot packs.  Could represent a superficial thrombophlebitis as well.  I doubt any type of DVT.  We did discuss checking blood work including a d-dimer, however he preferred to go home I think that is safe.  If however he develops worsening symptoms particular pain or swelling in his calf or shortness of breath or chest pain he should return right away.    New Prescriptions    SULFAMETHOXAZOLE-TRIMETHOPRIM (BACTRIM DS) " 800-160 MG TABLET    Take 1 tablet by mouth 2 times daily for 10 days       Final diagnoses:   Cellulitis and abscess of leg - mild       3/20/2020   [facility]     Bret Moreno MD  03/20/20 [id number]

## 2020-03-20 NOTE — ED TRIAGE NOTES
Patient complaining of swelling in right lower leg and foot.  Stated it started last Thursday.   Patient unsure of injury.

## 2020-05-19 ENCOUNTER — HOSPITAL ENCOUNTER (EMERGENCY)
Facility: OTHER | Age: 50
Discharge: HOME | End: 2020-05-19
Attending: PHYSICIAN ASSISTANT | Admitting: PHYSICIAN ASSISTANT
Payer: COMMERCIAL

## 2020-05-19 ENCOUNTER — APPOINTMENT (OUTPATIENT)
Dept: GENERAL RADIOLOGY | Facility: OTHER | Age: 50
End: 2020-05-19
Attending: PHYSICIAN ASSISTANT
Payer: COMMERCIAL

## 2020-05-19 VITALS
BODY MASS INDEX: 27.37 KG/M2 | DIASTOLIC BLOOD PRESSURE: 97 MMHG | WEIGHT: 180 LBS | SYSTOLIC BLOOD PRESSURE: 136 MMHG | OXYGEN SATURATION: 96 % | RESPIRATION RATE: 16 BRPM | HEART RATE: 93 BPM | TEMPERATURE: 97.8 F
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
## 2020-05-19 DIAGNOSIS — S50.812A ABRASION OF LEFT FOREARM, INITIAL ENCOUNTER: ICD-10-CM

## 2020-05-19 PROCEDURE — 99282 EMERGENCY DEPT VISIT SF MDM: CPT | Mod: Z6 | Performed by: PHYSICIAN ASSISTANT

## 2020-05-19 PROCEDURE — 99283 EMERGENCY DEPT VISIT LOW MDM: CPT | Performed by: PHYSICIAN ASSISTANT

## 2020-05-19 PROCEDURE — 73090 X-RAY EXAM OF FOREARM: CPT | Mod: LT

## 2020-05-19 ASSESSMENT — ENCOUNTER SYMPTOMS
CHEST TIGHTNESS: 0
FREQUENCY: 0
SORE THROAT: 0
CONFUSION: 0
BRUISES/BLEEDS EASILY: 0
SINUS PRESSURE: 0
SEIZURES: 0
EYE PAIN: 0
HEADACHES: 0
FACIAL SWELLING: 0
ADENOPATHY: 0
DYSURIA: 0
NECK PAIN: 0
SHORTNESS OF BREATH: 0
BACK PAIN: 0
DIARRHEA: 0
FEVER: 0
DIZZINESS: 0
APPETITE CHANGE: 0
VOMITING: 0
ABDOMINAL PAIN: 0
FATIGUE: 0
TROUBLE SWALLOWING: 0
COUGH: 0
CHILLS: 0
HEMATURIA: 0
ACTIVITY CHANGE: 0
LIGHT-HEADEDNESS: 0
WEAKNESS: 0
NAUSEA: 0
WOUND: 0
VOICE CHANGE: 0

## 2020-05-19 NOTE — ED AVS SNAPSHOT
[facility]  [address]  [place] [address]  Phone:  [phone]  Fax:  [phone]                                    Wellington Moreno   MRN: [id number]    Department:  [facility]   Date of Visit:  5/19/2020           After Visit Summary Signature Page    I have received my discharge instructions, and my questions have been answered. I have discussed any challenges I see with this plan with the nurse or doctor.    ..........................................................................................................................................  Patient/Patient Representative Signature      ..........................................................................................................................................  Patient Representative Print Name and Relationship to Patient    ..................................................               ................................................  Date                                   Time    ..........................................................................................................................................  Reviewed by Signature/Title    ...................................................              ..............................................  Date                                               Time          22EPIC Rev 08/18

## 2020-05-20 NOTE — ED TRIAGE NOTES
Patient reporting falling on left forearm. Small laceration on left forearm, bleeding controlled. Patient reporting CMS in tact in left hand. Feels like previous broken bones. Ice applied at home.

## 2020-05-20 NOTE — ED PROVIDER NOTES
History     Chief Complaint   Patient presents with     Arm Injury     HPI  Wellington Moreno is a 50 year old male who reportedly tripped over his dog and fell landing on his outstretched left forearm area he sustained an abrasion/laceration to the mid forearm area.  He is here for further evaluation at this time.  He is current on his tetanus having received one last year.  Denies any other issues.  Denies any head injury.  No loss of consciousness.  Denies any other issues.  He was seen by his primary yesterday in fact requesting serological testing since he thinks he may have had COVID exposure months ago.  Those results are pending.  He denies any fever or chills.  No sore throat, cough or flulike symptoms.  No lightheadedness or dizziness.  No nausea or vomiting.    Allergies:  Allergies   Allergen Reactions     Dogs      Pollen Extract      Trees, flowers grass       Problem List:    Patient Active Problem List    Diagnosis Date Noted     Esophageal reflux 03/20/2020     Priority: Medium     Healthcare maintenance 03/20/2020     Priority: Medium     Hyperlipidemia 03/20/2020     Priority: Medium     Major depressive disorder, recurrent, in full remission (H) 03/20/2020     Priority: Medium     Rosacea 03/20/2020     Priority: Medium     Decreased hearing of both ears 02/28/2020     Priority: Medium     Lipoma of scalp 12/07/2019     Priority: Medium     Tubular adenoma of colon 09/21/2019     Priority: Medium     Removed in colonoscopy with Dr. Newsome:  Specimen: Large Intestine Right/Ascending Colon, Ascending colon polyp Final Dx   Colon, ascending, polypectomy:   - Tubular adenoma.   - Negative for high grade dysplasia       Allergic rhinitis 09/12/2019     Priority: Medium     Anxiety 08/17/2019     Priority: Medium     Left wrist pain 08/17/2019     Priority: Medium     Primary insomnia 08/17/2019     Priority: Medium     Restless leg syndrome 08/17/2019     Priority: Medium     Severe episode of recurrent  major depressive disorder, without psychotic features (H) 08/17/2019     Priority: Medium     Gastroesophageal reflux disease with esophagitis 06/05/2019     Priority: Medium     ZHANG (obstructive sleep apnea) 06/05/2019     Priority: Medium     Overview:   Sleep study around 2012, intolerant of CPAP   Sleep study around 2012, intolerant of CPAP       Environmental allergies 04/11/2019     Priority: Medium     Injury of left shoulder 02/22/2018     Priority: Medium     Adhesive capsulitis 05/16/2013     Priority: Medium     Shoulder impingement 03/30/2012     Priority: Medium     Scapular dyskinesis 01/04/2012     Priority: Medium        Past Medical History:    History reviewed. No pertinent past medical history.    Past Surgical History:    History reviewed. No pertinent surgical history.    Family History:    History reviewed. No pertinent family history.    Social History:  Marital Status:  Single [1]  Social History     Tobacco Use     Smoking status: Never Smoker     Smokeless tobacco: Never Used     Tobacco comment: no ecig   Substance Use Topics     Alcohol use: Yes     Frequency: 2-4 times a month     Drug use: Yes     Types: Marijuana        Medications:    ibuprofen (ADVIL/MOTRIN) 800 MG tablet  pantoprazole (PROTONIX) 40 MG EC tablet  traZODone (DESYREL) 50 MG tablet  venlafaxine (EFFEXOR-XR) 150 MG 24 hr capsule  albuterol (PROAIR HFA/PROVENTIL HFA/VENTOLIN HFA) 108 (90 Base) MCG/ACT inhaler  amphetamine-dextroamphetamine (ADDERALL XR) 15 MG 24 hr capsule  fluticasone (FLONASE) 50 MCG/ACT nasal spray  gabapentin (NEURONTIN) 300 MG capsule  olopatadine (PATANOL) 0.1 % ophthalmic solution          Review of Systems   Constitutional: Negative for activity change, appetite change, chills, fatigue and fever.   HENT: Negative for congestion, facial swelling, sinus pressure, sore throat, trouble swallowing and voice change.    Eyes: Negative for pain and visual disturbance.   Respiratory: Negative for cough,  chest tightness and shortness of breath.    Cardiovascular: Negative for chest pain.   Gastrointestinal: Negative for abdominal pain, diarrhea, nausea and vomiting.   Genitourinary: Negative for dysuria, frequency, hematuria and urgency.   Musculoskeletal: Negative for back pain and neck pain.        Left forearm injury with abrasion   Skin: Negative for rash and wound.   Neurological: Negative for dizziness, seizures, syncope, weakness, light-headedness and headaches.   Hematological: Negative for adenopathy. Does not bruise/bleed easily.   Psychiatric/Behavioral: Negative for confusion.       Physical Exam   BP: (!) 136/97  Pulse: 93  Temp: 97.8  F (36.6  C)  Resp: 16  Weight: 81.6 kg (180 lb)  SpO2: 96 %      Physical Exam  Constitutional:       General: He is not in acute distress.     Appearance: He is not ill-appearing, toxic-appearing or diaphoretic.   HENT:      Head: Atraumatic.      Right Ear: Tympanic membrane normal. No drainage or tenderness.      Left Ear: Tympanic membrane normal. No drainage or tenderness.      Nose: Nose normal. No rhinorrhea.   Eyes:      General: No scleral icterus.     Extraocular Movements: Extraocular movements intact.      Right eye: Normal extraocular motion and no nystagmus.      Left eye: Normal extraocular motion and no nystagmus.      Pupils: Pupils are equal, round, and reactive to light. Pupils are equal.      Funduscopic exam:     Right eye: No AV nicking or papilledema. Red reflex present.         Left eye: No AV nicking or papilledema. Red reflex present.  Neck:      Musculoskeletal: Normal range of motion. No neck rigidity, pain with movement or muscular tenderness.      Vascular: No JVD.      Trachea: No tracheal deviation.   Cardiovascular:      Rate and Rhythm: Normal rate and regular rhythm.      Heart sounds: Normal heart sounds. No friction rub.   Pulmonary:      Effort: No respiratory distress.      Breath sounds: Normal breath sounds. No stridor.    Abdominal:      General: Bowel sounds are normal.      Palpations: Abdomen is soft.      Tenderness: There is no abdominal tenderness. There is no guarding or rebound.   Musculoskeletal: Normal range of motion.         General: No tenderness.      Comments: Left forearm injury with a small abrasion to the midportion of it.  He has full flexion extension at the elbow.  Pronation and supination are intact.  He can flex and extend at the wrist.  No obvious signs of deformity.  Good cap refill neurologically he is intact.   Lymphadenopathy:      Cervical: No cervical adenopathy.      Right cervical: No superficial cervical adenopathy.     Left cervical: No superficial cervical adenopathy.   Skin:     General: Skin is warm.      Capillary Refill: Capillary refill takes less than 2 seconds.      Findings: No rash.   Neurological:      General: No focal deficit present.      Mental Status: He is alert and oriented to person, place, and time.         ED Course     Results for orders placed or performed during the hospital encounter of 05/19/20 (from the past 24 hour(s))   XR Forearm Left 2 Views    Narrative    XR FOREARM LT 2 VW    HISTORY: 50 years Male fall    COMPARISON: None    TECHNIQUE: 2 views left forearm    FINDINGS: Joint spaces are congruent. Articular surfaces are smooth.  There is no evidence of forearm fracture.      Impression    IMPRESSION: No evidence of forearm fracture.    GRACE HE MD       Medications - No data to display    Assessments & Plan (with Medical Decision Making)     I have reviewed the nursing notes.    I have reviewed the findings, diagnosis, plan and need for follow up with the patient.      New Prescriptions    No medications on file       Final diagnoses:   Abrasion of left forearm, initial encounter   Fall at home     Afebrile.  Vital signs stable.  Patient tripped over his dog at home with fall at home.  Injury to his left forearm arm.  Appears to have an abrasion to the  left forearm area no significant laceration.  This was cleaned with povidone-iodine and bandaged with 4 x 4's.  Left forearm x-rays show no obvious fracture or deformity.  Abrasion left forearm.  I discussed using Tylenol Motrin for pain relief.  I discussed continued monitoring and return if there is any signs of infection further evaluation as needed.  The patient is current on his tetanus.  5/19/2020   [facility] PA-C  05/19/20 [id number]

## 2020-11-24 ENCOUNTER — HOSPITAL ENCOUNTER (OUTPATIENT)
Dept: GENERAL RADIOLOGY | Facility: OTHER | Age: 50
End: 2020-11-24
Attending: FAMILY MEDICINE
Payer: COMMERCIAL

## 2020-11-24 ENCOUNTER — HOSPITAL ENCOUNTER (OUTPATIENT)
Dept: MRI IMAGING | Facility: OTHER | Age: 50
End: 2020-11-24
Attending: FAMILY MEDICINE
Payer: COMMERCIAL

## 2020-11-24 DIAGNOSIS — M25.512 LEFT SHOULDER PAIN: ICD-10-CM

## 2020-11-24 PROCEDURE — 73222 MRI JOINT UPR EXTREM W/DYE: CPT | Mod: LT

## 2020-11-24 PROCEDURE — A9575 INJ GADOTERATE MEGLUMI 0.1ML: HCPCS | Performed by: RADIOLOGY

## 2020-11-24 PROCEDURE — 255N000002 HC RX 255 OP 636: Performed by: RADIOLOGY

## 2020-11-24 PROCEDURE — 23350 INJECTION FOR SHOULDER X-RAY: CPT

## 2020-11-24 PROCEDURE — 250N000009 HC RX 250: Performed by: RADIOLOGY

## 2020-11-24 RX ORDER — LIDOCAINE HYDROCHLORIDE 10 MG/ML
2 INJECTION, SOLUTION INFILTRATION; PERINEURAL ONCE
Status: COMPLETED | OUTPATIENT
Start: 2020-11-24 | End: 2020-11-24

## 2020-11-24 RX ORDER — GADOTERATE MEGLUMINE 376.9 MG/ML
0.1 INJECTION INTRAVENOUS ONCE
Status: COMPLETED | OUTPATIENT
Start: 2020-11-24 | End: 2020-11-24

## 2020-11-24 RX ADMIN — LIDOCAINE HYDROCHLORIDE 2 ML: 10 INJECTION, SOLUTION INFILTRATION; PERINEURAL at 13:42

## 2020-11-24 RX ADMIN — GADOTERATE MEGLUMINE 0.1 ML: 376.9 INJECTION INTRAVENOUS at 13:42

## 2020-11-24 RX ADMIN — IOHEXOL 5 ML: 240 INJECTION, SOLUTION INTRATHECAL; INTRAVASCULAR; INTRAVENOUS; ORAL at 13:42

## 2021-04-25 ENCOUNTER — HEALTH MAINTENANCE LETTER (OUTPATIENT)
Age: 51
End: 2021-04-25

## 2021-10-10 ENCOUNTER — HEALTH MAINTENANCE LETTER (OUTPATIENT)
Age: 51
End: 2021-10-10

## 2021-12-14 ENCOUNTER — OFFICE VISIT (OUTPATIENT)
Dept: NEUROLOGY | Facility: OTHER | Age: 51
End: 2021-12-14
Attending: PSYCHIATRY & NEUROLOGY
Payer: COMMERCIAL

## 2021-12-14 VITALS
BODY MASS INDEX: 26.51 KG/M2 | RESPIRATION RATE: 18 BRPM | HEART RATE: 67 BPM | OXYGEN SATURATION: 93 % | DIASTOLIC BLOOD PRESSURE: 88 MMHG | TEMPERATURE: 96.5 F | SYSTOLIC BLOOD PRESSURE: 130 MMHG | HEIGHT: 69 IN | WEIGHT: 179 LBS

## 2021-12-14 DIAGNOSIS — M79.641 PAIN OF RIGHT HAND: ICD-10-CM

## 2021-12-14 DIAGNOSIS — G56.01 CARPAL TUNNEL SYNDROME OF RIGHT WRIST: Primary | ICD-10-CM

## 2021-12-14 PROCEDURE — 95912 NRV CNDJ TEST 11-12 STUDIES: CPT | Mod: 26 | Performed by: PSYCHIATRY & NEUROLOGY

## 2021-12-14 PROCEDURE — G0463 HOSPITAL OUTPT CLINIC VISIT: HCPCS

## 2021-12-14 PROCEDURE — 95886 MUSC TEST DONE W/N TEST COMP: CPT | Performed by: PSYCHIATRY & NEUROLOGY

## 2021-12-14 PROCEDURE — 99205 OFFICE O/P NEW HI 60 MIN: CPT | Mod: 25 | Performed by: PSYCHIATRY & NEUROLOGY

## 2021-12-14 PROCEDURE — 95886 MUSC TEST DONE W/N TEST COMP: CPT | Mod: 26 | Performed by: PSYCHIATRY & NEUROLOGY

## 2021-12-14 PROCEDURE — 95912 NRV CNDJ TEST 11-12 STUDIES: CPT | Performed by: PSYCHIATRY & NEUROLOGY

## 2021-12-14 RX ORDER — OLOPATADINE HYDROCHLORIDE 2 MG/ML
SOLUTION/ DROPS OPHTHALMIC
COMMUNITY
Start: 2021-02-10

## 2021-12-14 RX ORDER — AMLODIPINE BESYLATE 2.5 MG/1
1 TABLET ORAL DAILY
COMMUNITY
Start: 2021-11-13

## 2021-12-14 RX ORDER — VORTIOXETINE 20 MG/1
TABLET, FILM COATED ORAL
COMMUNITY
Start: 2021-12-09

## 2021-12-14 RX ORDER — SILDENAFIL 25 MG/1
25 TABLET, FILM COATED ORAL DAILY
COMMUNITY
Start: 2021-03-03 | End: 2022-06-20

## 2021-12-14 RX ORDER — AMLODIPINE BESYLATE 2.5 MG/1
TABLET ORAL
COMMUNITY
Start: 2021-11-13

## 2021-12-14 RX ORDER — EMTRICITABINE AND TENOFOVIR ALAFENAMIDE 200; 25 MG/1; MG/1
1 TABLET ORAL
COMMUNITY
Start: 2021-12-08 | End: 2022-06-20

## 2021-12-14 RX ORDER — ZOLPIDEM TARTRATE 10 MG/1
TABLET ORAL
COMMUNITY
Start: 2021-04-13 | End: 2022-06-20

## 2021-12-14 ASSESSMENT — PAIN SCALES - GENERAL: PAINLEVEL: NO PAIN (0)

## 2021-12-14 ASSESSMENT — MIFFLIN-ST. JEOR: SCORE: 1652.32

## 2021-12-14 NOTE — LETTER
12/14/2021         RE: Wellington Moreno  [address] [facility] [address]        Dear Colleague,    Thank you for referring your patient, Wellington Moreno, to the [facility]. Please see a copy of my visit note below.    Visit Date: 12/14/2021    NEURODIAGNOSTICS CONSULTATION    HISTORY OF PRESENT ILLNESS:  The patient is a 51-year-old man who describes pain at the medial and lateral aspects of the right elbow.  He also describes pain in the right wrist and episodic sensory loss in the right hand, to a lesser extent the left hand.  He also complains of some left wrist pain.  He is here with concerns for right carpal tunnel syndrome.    PAST MEDICAL HISTORY:  Seems to be noncontributory.  The patient has not had any specific injury to the upper extremities or to the neck.  He does not have any known rheumatologic disorder.    REVIEW OF SYSTEMS:  A 10-system review of systems is negative.    FAMILY HISTORY:  Negative for focal and generalized neuropathy.    SOCIAL HISTORY:  The patient is self-employed as a  and rivas.  Medical assistance.  Remote history of tobacco use.    PHYSICAL EXAMINATION:  The patient is 69 inches tall and weighs 179 pounds.  Blood pressure is 130/88.  Strength is 5/5 bilaterally for the intrinsic hand muscles, finger extensors and flexors, biceps and triceps.  Reflexes are 1+ bilaterally for the biceps, triceps and brachioradialis tendons.  Gait is normal.  Pinprick is intact in the cervical dermatomes of the upper extremities.    NERVE CONDUCTION STUDIES:  Motor nerve conduction testing was performed for the right median, ulnar, and radial nerves.  The ulnar and radial studies were normal, but there was moderate latency prolongation and slowing at the wrist for the median nerve.  H reflex latencies were normal throughout.    Orthodromic mixed conduction studies were performed for the right median and ulnar nerves.  Antidromic sensory nerve  conduction studies were performed for the second digital, fifth digital and radial nerves.  There was moderate latency prolongation and slowing for the median and second digital nerves.  Second digital waveform was dispersed with reduced amplitude.    MONOPOLAR EMG NEEDLE EXAMINATION:  Monopolar EMG needle examination was performed for the right first dorsal interosseous, extensor digitorum communis, flexor carpi radialis, triceps and brachioradialis.  All of the tested muscles showed normal insertional activity.  Motor units were normal in size with normal recruitment and interference.    IMPRESSION:  The patient has moderate grade right carpal tunnel syndrome.  The degree of conduction slowing seen for the median nerve is great enough that resolution cannot be expected with nonsurgical treatment.  He has symptoms consistent with carpal tunnel syndrome on the left and probably has this condition on the left side as well.  On the right, he is neurologically healthy except for carpal tunnel syndrome.  He complains of pain at the lateral and medial aspects of the elbow, but this is probably epicondylitis.  There is no evidence for dysfunction or injury to the radial nerve or ulnar nerve at the elbow or elsewhere.    Findings were reviewed with the patient.    Max Bernabe MD        D: 2021   T: 2021   MT: [facility]    Name:     VANDANA CAMPOS  MRN:      [id number]        Account:    [id number]   :      1970           Visit Date: 2021     Document: [id number]      Again, thank you for allowing me to participate in the care of your patient.        Sincerely,        Max Bernabe MD

## 2021-12-14 NOTE — PROGRESS NOTES
Visit Date: 12/14/2021    NEURODIAGNOSTICS CONSULTATION    HISTORY OF PRESENT ILLNESS:  The patient is a 51-year-old man who describes pain at the medial and lateral aspects of the right elbow.  He also describes pain in the right wrist and episodic sensory loss in the right hand, to a lesser extent the left hand.  He also complains of some left wrist pain.  He is here with concerns for right carpal tunnel syndrome.    PAST MEDICAL HISTORY:  Seems to be noncontributory.  The patient has not had any specific injury to the upper extremities or to the neck.  He does not have any known rheumatologic disorder.    REVIEW OF SYSTEMS:  A 10-system review of systems is negative.    FAMILY HISTORY:  Negative for focal and generalized neuropathy.    SOCIAL HISTORY:  The patient is self-employed as a  and rivas.  Medical assistance.  Remote history of tobacco use.    PHYSICAL EXAMINATION:  The patient is 69 inches tall and weighs 179 pounds.  Blood pressure is 130/88.  Strength is 5/5 bilaterally for the intrinsic hand muscles, finger extensors and flexors, biceps and triceps.  Reflexes are 1+ bilaterally for the biceps, triceps and brachioradialis tendons.  Gait is normal.  Pinprick is intact in the cervical dermatomes of the upper extremities.    NERVE CONDUCTION STUDIES:  Motor nerve conduction testing was performed for the right median, ulnar, and radial nerves.  The ulnar and radial studies were normal, but there was moderate latency prolongation and slowing at the wrist for the median nerve.  H reflex latencies were normal throughout.    Orthodromic mixed conduction studies were performed for the right median and ulnar nerves.  Antidromic sensory nerve conduction studies were performed for the second digital, fifth digital and radial nerves.  There was moderate latency prolongation and slowing for the median and second digital nerves.  Second digital waveform was dispersed with reduced  amplitude.    MONOPOLAR EMG NEEDLE EXAMINATION:  Monopolar EMG needle examination was performed for the right first dorsal interosseous, extensor digitorum communis, flexor carpi radialis, triceps and brachioradialis.  All of the tested muscles showed normal insertional activity.  Motor units were normal in size with normal recruitment and interference.    IMPRESSION:  The patient has moderate grade right carpal tunnel syndrome.  The degree of conduction slowing seen for the median nerve is great enough that resolution cannot be expected with nonsurgical treatment.  He has symptoms consistent with carpal tunnel syndrome on the left and probably has this condition on the left side as well.  On the right, he is neurologically healthy except for carpal tunnel syndrome.  He complains of pain at the lateral and medial aspects of the elbow, but this is probably epicondylitis.  There is no evidence for dysfunction or injury to the radial nerve or ulnar nerve at the elbow or elsewhere.    Findings were reviewed with the patient.    Max Bernabe MD        D: 2021   T: 2021   MT: [facility]    Name:     ANDRES VANDANA MORSEFred  MRN:      [id number]        Account:    [id number]   :      1970           Visit Date: 2021     Document: [id number]

## 2021-12-14 NOTE — NURSING NOTE
"Chief Complaint   Patient presents with     Neurologic Problem       Initial /88   Pulse 67   Temp (!) 96.5  F (35.8  C) (Tympanic)   Resp 18   Ht 1.753 m (5' 9\")   Wt 81.2 kg (179 lb)   SpO2 93%   BMI 26.43 kg/m   Estimated body mass index is 26.43 kg/m  as calculated from the following:    Height as of this encounter: 1.753 m (5' 9\").    Weight as of this encounter: 81.2 kg (179 lb).  Medication Reconciliation: complete    FOOD SECURITY SCREENING QUESTIONS  Hunger Vital Signs:  Within the past 12 months we worried whether our food would run out before we got money to buy more. Never  Within the past 12 months the food we bought just didn't last and we didn't have money to get more. Never    Advanced Care Directive Reviewed    Raciel Clnie LPN    "

## 2022-04-13 ENCOUNTER — APPOINTMENT (OUTPATIENT)
Dept: GENERAL RADIOLOGY | Facility: OTHER | Age: 52
End: 2022-04-13
Attending: PHYSICIAN ASSISTANT
Payer: COMMERCIAL

## 2022-04-13 ENCOUNTER — HOSPITAL ENCOUNTER (EMERGENCY)
Facility: OTHER | Age: 52
Discharge: HOME | End: 2022-04-13
Attending: PHYSICIAN ASSISTANT | Admitting: PHYSICIAN ASSISTANT
Payer: COMMERCIAL

## 2022-04-13 VITALS
OXYGEN SATURATION: 98 % | WEIGHT: 175 LBS | TEMPERATURE: 97.8 F | SYSTOLIC BLOOD PRESSURE: 146 MMHG | HEIGHT: 69 IN | DIASTOLIC BLOOD PRESSURE: 84 MMHG | BODY MASS INDEX: 25.92 KG/M2 | HEART RATE: 86 BPM | RESPIRATION RATE: 16 BRPM

## 2022-04-13 DIAGNOSIS — T14.8XXA OPEN WOUND: ICD-10-CM

## 2022-04-13 PROCEDURE — 90715 TDAP VACCINE 7 YRS/> IM: CPT | Performed by: PHYSICIAN ASSISTANT

## 2022-04-13 PROCEDURE — 99283 EMERGENCY DEPT VISIT LOW MDM: CPT | Mod: 25 | Performed by: PHYSICIAN ASSISTANT

## 2022-04-13 PROCEDURE — 250N000011 HC RX IP 250 OP 636: Performed by: PHYSICIAN ASSISTANT

## 2022-04-13 PROCEDURE — 99283 EMERGENCY DEPT VISIT LOW MDM: CPT | Performed by: PHYSICIAN ASSISTANT

## 2022-04-13 PROCEDURE — 73090 X-RAY EXAM OF FOREARM: CPT | Mod: LT

## 2022-04-13 PROCEDURE — 99282 EMERGENCY DEPT VISIT SF MDM: CPT | Performed by: PHYSICIAN ASSISTANT

## 2022-04-13 PROCEDURE — 90471 IMMUNIZATION ADMIN: CPT | Performed by: PHYSICIAN ASSISTANT

## 2022-04-13 PROCEDURE — 99284 EMERGENCY DEPT VISIT MOD MDM: CPT | Performed by: PHYSICIAN ASSISTANT

## 2022-04-13 RX ADMIN — TETANUS TOXOID, REDUCED DIPHTHERIA TOXOID AND ACELLULAR PERTUSSIS VACCINE, ADSORBED 0.5 ML: 5; 2.5; 8; 8; 2.5 SUSPENSION INTRAMUSCULAR at 17:54

## 2022-04-13 NOTE — ED TRIAGE NOTES
"ED Nursing Triage Note (General)   ________________________________    Wellington Moreno is a 51 year old Male that presents to triage private car  With history of getting tip of razor blade stuck in left forearm. No bleeding at this time.    BP (!) 146/84   Pulse 86   Temp 97.8  F (36.6  C)   Resp 16   Ht 1.753 m (5' 9\")   Wt 79.4 kg (175 lb)   SpO2 98%   BMI 25.84 kg/m  t  Patient appears alert , in no acute distress., and cooperative behavior.    GCS Total = 15  Airway: intact  Breathing noted as Normal  Circulation Normal  Skin:  Normal      PRE HOSPITAL PRIOR LIVING SITUATION Significant Other    "

## 2022-04-13 NOTE — ED PROVIDER NOTES
History     Chief Complaint   Patient presents with     Foreign Body in Skin     HPI  Wellington Moreno is a 51 year old male who who presents to the emergency department for evaluation was using a  today  broke off actually hit him in the left forearm.  He has a small little cut he is not sure if there is a foreign body in there or not could not find the tip of the .  He does not have any foreign body sensation.  He does have a cut superficial 1 cm in length palmar aspect of the forearm mid forearm.  No distal anesthesia bleeding is controlled no neck pain chest pain shortness of breath no abdominal pain or constipation no other injuries.    Allergies:  Allergies   Allergen Reactions     Dogs      Pollen Extract      Trees, flowers grass       Problem List:    Patient Active Problem List    Diagnosis Date Noted     Carpal tunnel syndrome of right wrist 12/14/2021     Priority: Medium     Esophageal reflux 03/20/2020     Priority: Medium     Healthcare maintenance 03/20/2020     Priority: Medium     Hyperlipidemia 03/20/2020     Priority: Medium     Major depressive disorder, recurrent, in full remission (H) 03/20/2020     Priority: Medium     Rosacea 03/20/2020     Priority: Medium     Decreased hearing of both ears 02/28/2020     Priority: Medium     Lipoma of scalp 12/07/2019     Priority: Medium     Tubular adenoma of colon 09/21/2019     Priority: Medium     Removed in colonoscopy with Dr. Newsome:  Specimen: Large Intestine Right/Ascending Colon, Ascending colon polyp Final Dx   Colon, ascending, polypectomy:   - Tubular adenoma.   - Negative for high grade dysplasia       Allergic rhinitis 09/12/2019     Priority: Medium     Anxiety 08/17/2019     Priority: Medium     Left wrist pain 08/17/2019     Priority: Medium     Primary insomnia 08/17/2019     Priority: Medium     Restless leg syndrome 08/17/2019     Priority: Medium     Severe episode of recurrent major depressive disorder,  without psychotic features (H) 08/17/2019     Priority: Medium     Gastroesophageal reflux disease with esophagitis 06/05/2019     Priority: Medium     ZHANG (obstructive sleep apnea) 06/05/2019     Priority: Medium     Overview:   Sleep study around 2012, intolerant of CPAP   Sleep study around 2012, intolerant of CPAP       Environmental allergies 04/11/2019     Priority: Medium     Injury of left shoulder 02/22/2018     Priority: Medium     Adhesive capsulitis 05/16/2013     Priority: Medium     Shoulder impingement 03/30/2012     Priority: Medium     Scapular dyskinesis 01/04/2012     Priority: Medium        Past Medical History:    History reviewed. No pertinent past medical history.    Past Surgical History:    History reviewed. No pertinent surgical history.    Family History:    History reviewed. No pertinent family history.    Social History:  Marital Status:  Single [1]  Social History     Tobacco Use     Smoking status: Never Smoker     Smokeless tobacco: Never Used     Tobacco comment: no ecig   Substance Use Topics     Alcohol use: Yes     Comment: couple a week     Drug use: Yes     Types: Marijuana     Comment: Medical Marijuana at night to sleep        Medications:    albuterol (PROAIR HFA/PROVENTIL HFA/VENTOLIN HFA) 108 (90 Base) MCG/ACT inhaler  amLODIPine (NORVASC) 2.5 MG tablet  amLODIPine (NORVASC) 2.5 MG tablet  amphetamine-dextroamphetamine (ADDERALL XR) 15 MG 24 hr capsule  emtricitabine-tenofovir AF (DESCOVY) 200-25 MG per tablet  fluticasone (FLONASE) 50 MCG/ACT nasal spray  gabapentin (NEURONTIN) 300 MG capsule  ibuprofen (ADVIL/MOTRIN) 800 MG tablet  loratadine-pseudoePHEDrine (CLARITIN-D 24-HOUR)  MG 24 hr tablet  olopatadine (PATADAY) 0.2 % ophthalmic solution  olopatadine (PATANOL) 0.1 % ophthalmic solution  pantoprazole (PROTONIX) 40 MG EC tablet  sildenafil (VIAGRA) 25 MG tablet  traZODone (DESYREL) 50 MG tablet  TRINTELLIX 20 MG tablet  venlafaxine (EFFEXOR-XR) 150 MG 24 hr  "capsule  zolpidem (AMBIEN) 10 MG tablet          Review of Systems   Please see HPI for pertinent positives and negatives.  All other systems reviewed and found to be negative.      Physical Exam   BP: (!) 146/84  Pulse: 86  Temp: 97.8  F (36.6  C)  Resp: 16  Height: 175.3 cm (5' 9\")  Weight: 79.4 kg (175 lb)  SpO2: 98 %      Physical Exam   Exam:  Constitutional: healthy, alert and no distress  Musculoskeletal: extremities normal- no gross deformities noted, gait normal and normal muscle tone  Skin: Superficial 1 cm noncomplex superficial cut left forarm  Neurologic: Gait normal. Reflexes normal and symmetric. Sensation grossly WNL.  Psychiatric: mentation appears normal and affect normal/bright  Hematologic/Lymphatic/Immunologic: Normal cervical lymph nodes      ED Course                 Procedures       Results for orders placed or performed during the hospital encounter of 04/13/22 (from the past 24 hour(s))   XR Forearm Port Left 2 Views    Narrative    PROCEDURE:  XR FOREARM PORT LEFT 2 VIEWS    HISTORY: Evaluation of foreign body left forearm    COMPARISON:  None.    TECHNIQUE:  2 views of the left forearm were obtained.    FINDINGS:  No fracture or dislocation is identified. The joint spaces  are preserved.  There is no radiopaque foreign body seen in the soft  tissues      Impression    IMPRESSION: No radiopaque foreign bodies are seen in the soft tissues       SMOOTH AGUILAR MD         SYSTEM ID:  [id number]       Medications   Tdap (tetanus-diptheria-acell pertussis) (BOOSTRIX) injection ALEJANDRO 0.5 mL (has no administration in time range)       Assessments & Plan (with Medical Decision Making)     I have reviewed the nursing notes.    I have reviewed the findings, diagnosis, plan and need for follow up with the patient.  Pleasant male who presents emerge department for evaluation with concern for foreign body he has no foreign body sensation 1 cm noncomplex cut left forearm superficial x-ray obtained no " radiopaque foreign body noted soft tissues.  He will be discharged home with close follow-up in 40 hours wound check if symptoms worsen return emerge department  Patient receive a tetanus shot here today he will keep the area clean and dry      New Prescriptions    No medications on file       Final diagnoses:   Open wound       4/13/2022   [facility]     Oscar Castillo PA-C  04/13/22 [id number]

## 2022-04-13 NOTE — DISCHARGE INSTRUCTIONS
FOLLOW UP IN 48 HOURS FOR A WOUND CHECK   IF SYMPTOMS WORSEN OR IF YOU HAVE FURTHER CONCERNS COME BACK TO THE ER     KEEP AREA CLEAN AND DRY BUT USING SOAP AND WATER TO KEEP IT CLEAN IS IMPORTANT.     YOU MAY APPLY VITAMIN E SOLUTION TO THE WOUND IT MAY HELP WITH SOME SCARING     THE DISCHARGE INSTRUCTIONS ARE INTENDED AS A COMPLEMENT TO AND NOT A REPLACEMENT FOR THE VERBAL INSTRUCTIONS THAT I HAVE PROVIDED YOU TONIGHT. AFTER GOING OVER THE PLAN OF CARE TONIGHT AND PROVIDING YOU WITH THE VERBAL INSTRUCTIONS AT DISCHARGE YOU HAVE HAD THE OPPORTUNITY TO ASK FURTHER QUESTIONS AND TO CLARIFY UNCERTAINTIES. SINCE YOU HAVE NO FURTHER QUESTIONS PLEASE HAVE A WONDERFUL SAFE EVENING THANK YOU.     YOUR WOUNDS WERE IRRIGATED, EXPLORED AND CLEANED SEVERAL TIMES TO REMOVE ANY MATERIAL. THERE IS ALWAYS A CHANCE THAT THERE STILL MAYBE SOME SMALL PIECES THAT WERE NOT ABLE TO BE REMOVED. IF THAT SHOULD BE THE CASE IT MAY GET IN INFECTED. IT IS IMPORTANT FOR YOU FOLLOW UP AS ADVISED.       WOUND AND SUTURE CARE     GENERAL INFORMATION:    Suturing (stitching) of cuts is done to minimize scarring and to help prevent infection.  Some discomfort and swelling is to be expected following this injury.  It will take time for the wound to heal completely, so be gentle with the injured area.  It is normal for stitches to itch after about five days, but it is important not to scratch the area.    TREATMENT/SPECIAL INSTRUCTIONS:    1. If your cut starts to bleed, hold firm pressure over the area with a clean bandage or cloth.  Should this not stop the bleeding, see your family physician or return to the Emergency Trauma Center.    2. Take Tylenol or Aspirin for mild pain relief after the numbness wears off.  The numbness may last several hours.    3. Keep the area clean and dry for 48 hours.  For cuts on the scalp, you may wash your hair with clean tap water (not bath water), but avoid scrubbing the sutured area.  If the area gets dirty,  gently cleanse it with soap and water, or hydrogen peroxide.  Fluid often weeps from a sutured wound, forming a scab, and can make stitches difficult to remove.  By rubbing gently between the stitches daily with a cotton swab dipped in hydrogen peroxide, you can help prevent this scab formation.  Do not clean wounds if steri-strips were applied over the cut.    4. Keep the area covered with a dressing or Band-Aid, as directed by the physician.  Change the dressing if it becomes damp or soiled.    5. Closely watch the area for signs of infection.  Some examples of infection are:    =Excessive swelling, redness or pain.  =Area feels warm to touch.  =Pus-like drainage.  =Elevated temperature (fever).  =A throbbing sensation in the wound.  =A reddened streak extending from the laceration.    If any of the above occur, see your family physician or return to the Emergency Trauma Center.    6. You should have your sutures (stitches) removed in _______ days.    7. If a tetanus toxoid injection was given, keep a record of it at home.  In most situations, a tetanus toxoid injection is good for 10 years.    8. A cut takes a year to fully heal.  To achieve the best cosmetic result (minimize scarring), particularly on the face, avoid direct sunlight exposure to the wound for one year.  Either keep the wound covered or apply a sunscreen with an SPF of 15 or greater.    Should you have further questions or problems, please feel free to contact your family physician or call the Emergency Trauma Center      .[email or web address]

## 2022-06-20 ENCOUNTER — HOSPITAL ENCOUNTER (EMERGENCY)
Facility: OTHER | Age: 52
Discharge: HOME | End: 2022-06-20
Attending: PHYSICIAN ASSISTANT | Admitting: PHYSICIAN ASSISTANT
Payer: COMMERCIAL

## 2022-06-20 VITALS
OXYGEN SATURATION: 95 % | HEART RATE: 89 BPM | DIASTOLIC BLOOD PRESSURE: 86 MMHG | SYSTOLIC BLOOD PRESSURE: 124 MMHG | BODY MASS INDEX: 27.28 KG/M2 | TEMPERATURE: 97.8 F | RESPIRATION RATE: 18 BRPM | HEIGHT: 68 IN | WEIGHT: 180 LBS

## 2022-06-20 DIAGNOSIS — S01.511A LIP LACERATION, INITIAL ENCOUNTER: ICD-10-CM

## 2022-06-20 DIAGNOSIS — S00.31XA NOSE ABRASION: ICD-10-CM

## 2022-06-20 PROCEDURE — 12011 RPR F/E/E/N/L/M 2.5 CM/<: CPT | Performed by: PHYSICIAN ASSISTANT

## 2022-06-20 PROCEDURE — 99282 EMERGENCY DEPT VISIT SF MDM: CPT | Mod: 25 | Performed by: PHYSICIAN ASSISTANT

## 2022-06-20 PROCEDURE — 99283 EMERGENCY DEPT VISIT LOW MDM: CPT | Mod: 25 | Performed by: PHYSICIAN ASSISTANT

## 2022-06-21 NOTE — DISCHARGE INSTRUCTIONS
Get plenty of fluids and rest.  The stitches that we placed in your lip are dissolvable sutures so you will not need to have them taken out.  I recommend over the next few days to have a softer food diet and swish and spit with warm water up to 3 times per day and after every meal.  Steri-Strips will fall off on their own in the coming days.  Be aware for signs of infection including increased redness, purulent drainage, fevers however I think this is less likely.  Return if there are worsening or concerning symptoms otherwise follow-up with PCP as needed.

## 2022-06-21 NOTE — ED PROVIDER NOTES
History     Chief Complaint   Patient presents with     Laceration     HPI  Wellington Moreno is a 52 year old male who presents to the ED for evaluation of a laceration. Pt comes into the ER today reporting that he was going to bed and he tripped over a blanket in the dark and hit his face on the fire place. He has a laceration to his upper lip and a small abrasion to his nose. No blood thinners or LOC.    Allergies:  Allergies   Allergen Reactions     Dogs      Pollen Extract      Trees, flowers grass       Problem List:    Patient Active Problem List    Diagnosis Date Noted     Carpal tunnel syndrome of right wrist 12/14/2021     Priority: Medium     Esophageal reflux 03/20/2020     Priority: Medium     Healthcare maintenance 03/20/2020     Priority: Medium     Hyperlipidemia 03/20/2020     Priority: Medium     Major depressive disorder, recurrent, in full remission (H) 03/20/2020     Priority: Medium     Rosacea 03/20/2020     Priority: Medium     Decreased hearing of both ears 02/28/2020     Priority: Medium     Lipoma of scalp 12/07/2019     Priority: Medium     Tubular adenoma of colon 09/21/2019     Priority: Medium     Removed in colonoscopy with Dr. Newsome:  Specimen: Large Intestine Right/Ascending Colon, Ascending colon polyp Final Dx   Colon, ascending, polypectomy:   - Tubular adenoma.   - Negative for high grade dysplasia       Allergic rhinitis 09/12/2019     Priority: Medium     Anxiety 08/17/2019     Priority: Medium     Left wrist pain 08/17/2019     Priority: Medium     Primary insomnia 08/17/2019     Priority: Medium     Restless leg syndrome 08/17/2019     Priority: Medium     Severe episode of recurrent major depressive disorder, without psychotic features (H) 08/17/2019     Priority: Medium     Gastroesophageal reflux disease with esophagitis 06/05/2019     Priority: Medium     ZHANG (obstructive sleep apnea) 06/05/2019     Priority: Medium     Overview:   Sleep study around 2012, intolerant of  "CPAP   Sleep study around 2012, intolerant of CPAP       Environmental allergies 04/11/2019     Priority: Medium     Injury of left shoulder 02/22/2018     Priority: Medium     Adhesive capsulitis 05/16/2013     Priority: Medium     Shoulder impingement 03/30/2012     Priority: Medium     Scapular dyskinesis 01/04/2012     Priority: Medium        Past Medical History:    No past medical history on file.    Past Surgical History:    No past surgical history on file.    Family History:    No family history on file.    Social History:  Marital Status:  Single [1]  Social History     Tobacco Use     Smoking status: Never Smoker     Smokeless tobacco: Never Used     Tobacco comment: no ecig   Substance Use Topics     Alcohol use: Yes     Comment: couple a week     Drug use: Yes     Types: Marijuana     Comment: Medical Marijuana at night to sleep        Medications:    amLODIPine (NORVASC) 2.5 MG tablet  amphetamine-dextroamphetamine (ADDERALL XR) 15 MG 24 hr capsule  fluticasone (FLONASE) 50 MCG/ACT nasal spray  ibuprofen (ADVIL/MOTRIN) 800 MG tablet  loratadine-pseudoePHEDrine (CLARITIN-D 24-HOUR)  MG 24 hr tablet  olopatadine (PATADAY) 0.2 % ophthalmic solution  pantoprazole (PROTONIX) 40 MG EC tablet  TRINTELLIX 20 MG tablet  amLODIPine (NORVASC) 2.5 MG tablet  olopatadine (PATANOL) 0.1 % ophthalmic solution          Review of Systems   Constitutional: Negative for fever.   HENT: Negative for congestion.    Eyes: Negative for visual disturbance.   Respiratory: Negative for shortness of breath.    Cardiovascular: Negative for chest pain.   Gastrointestinal: Negative for abdominal pain, nausea and vomiting.   Genitourinary: Negative for dysuria.   Skin: Positive for wound.        Laceration to lip.   Psychiatric/Behavioral: Negative for confusion.       Physical Exam   BP: 124/86  Pulse: 89  Temp: 97.8  F (36.6  C)  Resp: 18  Height: 172.7 cm (5' 8\")  Weight: 81.6 kg (180 lb)  SpO2: 95 %      Physical " Exam  Constitutional:       General: He is not in acute distress.     Appearance: He is well-developed. He is not diaphoretic.   HENT:      Head: Normocephalic and atraumatic.      Mouth/Throat:      Comments: ~ 1.5cm vertical laceration to right upper lip, it does cross vermillion border, it is not through and through  Eyes:      General: No scleral icterus.     Conjunctiva/sclera: Conjunctivae normal.   Cardiovascular:      Rate and Rhythm: Normal rate and regular rhythm.   Pulmonary:      Effort: Pulmonary effort is normal.      Breath sounds: Normal breath sounds.   Abdominal:      Palpations: Abdomen is soft.      Tenderness: There is no abdominal tenderness.   Musculoskeletal:         General: No deformity.      Cervical back: Neck supple.   Lymphadenopathy:      Cervical: No cervical adenopathy.   Skin:     General: Skin is warm and dry.      Coloration: Skin is not jaundiced.      Findings: No rash.   Neurological:      Mental Status: He is alert and oriented to person, place, and time. Mental status is at baseline.   Psychiatric:         Mood and Affect: Mood normal.         Behavior: Behavior normal.         Thought Content: Thought content normal.         Judgment: Judgment normal.         ED Course                 [facility]    -Laceration Repair    Date/Time: 6/20/2022 10:18 PM  Performed by: Dallas Solitario PA  Authorized by: Dallas Solitario PA     Risks, benefits and alternatives discussed.      ANESTHESIA (see MAR for exact dosages):     Anesthesia method:  Local infiltration    Local anesthetic:  Lidocaine 1% w/o epi  LACERATION DETAILS     Location:  Lip    Lip location:  Upper exterior lip    Length (cm):  1.5  EXPLORATION:     Wound exploration: wound explored through full range of motion and entire depth of wound probed and visualized      Contaminated: no      TREATMENT:     Area cleansed with:  Saline    MUCOUS MEMBRANE REPAIR     Suture size:  5-0    Suture  material:  Fast-absorbing gut    Suture technique:  Simple interrupted    Number of sutures:  3    SKIN REPAIR     Repair method:  Steri-Strips    Number of Steri-Strips:  1    APPROXIMATION     Approximation:  Close    [place] border well-aligned: yes      POST-PROCEDURE DETAILS     Dressing:  Open (no dressing)        PROCEDURE    Patient Tolerance:  Patient tolerated the procedure well with no immediate complications                Critical Care time:  none               No results found for this or any previous visit (from the past 24 hour(s)).    Medications - No data to display    Assessments & Plan (with Medical Decision Making)   Pt nontoxic in NAD. Heart, lung, bowel sounds normal, abd soft, nontender to palpation, nondistended. VSS, afebrile.     He does have ~ 1.5cm vertical laceration to right upper lip, it does cross vermillion border, it is not through and through. No injury to teeth. Normal appearing nose, no nasal septal hematoma. No crepitus on face. He is not on blood thinners, low suspicion for intracranial bleed and according to [place] CT rule a CT is unnecessary, The [place] CT Rule suggests a head CT is not necessary for this patient (sensitivity % for all intracranial traumatic findings, sensitivity 100% for findings requiring neurosurgical intervention).    Wound is repaired, see procedure note above.    Sutures are dissolvable.     He will continue with a soft food diet.    Strict return precautions are given to the pt, they will return if symptoms are worsening or concerning. The pt understands and agrees with the plan and they are discharged.     Dallas Solitario PA-C        I have reviewed the nursing notes.    I have reviewed the findings, diagnosis, plan and need for follow up with the patient.       Discharge Medication List as of 6/20/2022 10:23 PM          Final diagnoses:   Lip laceration, initial encounter   Nose abrasion       6/20/2022   [facility]     Dallas Solitario PA  06/22/22 0130       Dallas Solitario PA  06/24/22 [id number]

## 2022-06-21 NOTE — ED TRIAGE NOTES
Pt comes into the ER today reporting that he was going to bed and he tripped over a blanket in the dark and hit his face on the fire place. He has a laceration to his upper lip and a small abrasion to his nose. No blood thinners or LOC     Triage Assessment     Row Name 06/20/22 2131       Respiratory WDL    Respiratory WDL WDL       Skin Circulation/Temperature WDL    Skin Circulation/Temperature WDL WDL       Cardiac WDL    Cardiac WDL WDL       Peripheral/Neurovascular WDL    Peripheral Neurovascular WDL WDL       Cognitive/Neuro/Behavioral WDL    Cognitive/Neuro/Behavioral WDL WDL

## 2022-06-22 ASSESSMENT — ENCOUNTER SYMPTOMS
CONFUSION: 0
NAUSEA: 0
WOUND: 1
DYSURIA: 0
ABDOMINAL PAIN: 0
FEVER: 0
VOMITING: 0
SHORTNESS OF BREATH: 0

## 2022-11-26 ENCOUNTER — HOSPITAL ENCOUNTER (EMERGENCY)
Facility: OTHER | Age: 52
Discharge: HOME | End: 2022-11-26
Attending: EMERGENCY MEDICINE | Admitting: EMERGENCY MEDICINE
Payer: COMMERCIAL

## 2022-11-26 ENCOUNTER — APPOINTMENT (OUTPATIENT)
Dept: CT IMAGING | Facility: OTHER | Age: 52
End: 2022-11-26
Attending: EMERGENCY MEDICINE
Payer: COMMERCIAL

## 2022-11-26 VITALS
WEIGHT: 175 LBS | RESPIRATION RATE: 17 BRPM | HEIGHT: 68 IN | SYSTOLIC BLOOD PRESSURE: 141 MMHG | BODY MASS INDEX: 26.52 KG/M2 | OXYGEN SATURATION: 96 % | TEMPERATURE: 99.3 F | DIASTOLIC BLOOD PRESSURE: 95 MMHG | HEART RATE: 96 BPM

## 2022-11-26 DIAGNOSIS — R53.83 OTHER FATIGUE: ICD-10-CM

## 2022-11-26 DIAGNOSIS — R10.84 ABDOMINAL PAIN, GENERALIZED: ICD-10-CM

## 2022-11-26 LAB
ALBUMIN SERPL BCG-MCNC: 3.7 G/DL (ref 3.5–5.2)
ALP SERPL-CCNC: 112 U/L (ref 40–129)
ALT SERPL W P-5'-P-CCNC: 68 U/L (ref 10–50)
ANION GAP SERPL CALCULATED.3IONS-SCNC: 9 MMOL/L (ref 7–15)
AST SERPL W P-5'-P-CCNC: 49 U/L (ref 10–50)
BASOPHILS # BLD AUTO: 0 10E3/UL (ref 0–0.2)
BASOPHILS NFR BLD AUTO: 1 %
BILIRUB SERPL-MCNC: 0.6 MG/DL
BUN SERPL-MCNC: 10.1 MG/DL (ref 6–20)
CALCIUM SERPL-MCNC: 8.9 MG/DL (ref 8.6–10)
CHLORIDE SERPL-SCNC: 96 MMOL/L (ref 98–107)
CREAT SERPL-MCNC: 0.96 MG/DL (ref 0.67–1.17)
DEPRECATED HCO3 PLAS-SCNC: 31 MMOL/L (ref 22–29)
EOSINOPHIL # BLD AUTO: 0.2 10E3/UL (ref 0–0.7)
EOSINOPHIL NFR BLD AUTO: 3 %
ERYTHROCYTE [DISTWIDTH] IN BLOOD BY AUTOMATED COUNT: 13 % (ref 10–15)
FLUAV RNA SPEC QL NAA+PROBE: NEGATIVE
FLUBV RNA RESP QL NAA+PROBE: NEGATIVE
GFR SERPL CREATININE-BSD FRML MDRD: >90 ML/MIN/1.73M2
GLUCOSE SERPL-MCNC: 115 MG/DL (ref 70–99)
HCT VFR BLD AUTO: 45.3 % (ref 40–53)
HGB BLD-MCNC: 15.5 G/DL (ref 13.3–17.7)
HOLD SPECIMEN: NORMAL
IMM GRANULOCYTES # BLD: 0.1 10E3/UL
IMM GRANULOCYTES NFR BLD: 2 %
LIPASE SERPL-CCNC: 29 U/L (ref 13–60)
LYMPHOCYTES # BLD AUTO: 1.4 10E3/UL (ref 0.8–5.3)
LYMPHOCYTES NFR BLD AUTO: 22 %
MCH RBC QN AUTO: 28.9 PG (ref 26.5–33)
MCHC RBC AUTO-ENTMCNC: 34.2 G/DL (ref 31.5–36.5)
MCV RBC AUTO: 84 FL (ref 78–100)
MONOCYTES # BLD AUTO: 0.6 10E3/UL (ref 0–1.3)
MONOCYTES NFR BLD AUTO: 10 %
NEUTROPHILS # BLD AUTO: 3.9 10E3/UL (ref 1.6–8.3)
NEUTROPHILS NFR BLD AUTO: 62 %
NRBC # BLD AUTO: 0 10E3/UL
NRBC BLD AUTO-RTO: 0 /100
PLAT MORPH BLD: ABNORMAL
PLATELET # BLD AUTO: 156 10E3/UL (ref 150–450)
POTASSIUM SERPL-SCNC: 3.5 MMOL/L (ref 3.4–5.3)
PROT SERPL-MCNC: 6.7 G/DL (ref 6.4–8.3)
RBC # BLD AUTO: 5.37 10E6/UL (ref 4.4–5.9)
RBC MORPH BLD: ABNORMAL
RSV RNA SPEC NAA+PROBE: NEGATIVE
SARS-COV-2 RNA RESP QL NAA+PROBE: NEGATIVE
SODIUM SERPL-SCNC: 136 MMOL/L (ref 136–145)
VARIANT LYMPHS BLD QL SMEAR: PRESENT
WBC # BLD AUTO: 6.3 10E3/UL (ref 4–11)

## 2022-11-26 PROCEDURE — 83690 ASSAY OF LIPASE: CPT | Performed by: EMERGENCY MEDICINE

## 2022-11-26 PROCEDURE — 80053 COMPREHEN METABOLIC PANEL: CPT | Performed by: EMERGENCY MEDICINE

## 2022-11-26 PROCEDURE — 258N000003 HC RX IP 258 OP 636: Performed by: EMERGENCY MEDICINE

## 2022-11-26 PROCEDURE — 87637 SARSCOV2&INF A&B&RSV AMP PRB: CPT | Performed by: EMERGENCY MEDICINE

## 2022-11-26 PROCEDURE — 99285 EMERGENCY DEPT VISIT HI MDM: CPT | Mod: 25,CS | Performed by: EMERGENCY MEDICINE

## 2022-11-26 PROCEDURE — C9803 HOPD COVID-19 SPEC COLLECT: HCPCS | Performed by: EMERGENCY MEDICINE

## 2022-11-26 PROCEDURE — 250N000011 HC RX IP 250 OP 636: Performed by: EMERGENCY MEDICINE

## 2022-11-26 PROCEDURE — 99283 EMERGENCY DEPT VISIT LOW MDM: CPT | Mod: CS | Performed by: EMERGENCY MEDICINE

## 2022-11-26 PROCEDURE — 74177 CT ABD & PELVIS W/CONTRAST: CPT | Mod: TC

## 2022-11-26 PROCEDURE — 85025 COMPLETE CBC W/AUTO DIFF WBC: CPT | Performed by: EMERGENCY MEDICINE

## 2022-11-26 RX ORDER — SODIUM CHLORIDE 9 MG/ML
INJECTION, SOLUTION INTRAVENOUS CONTINUOUS
Status: DISCONTINUED | OUTPATIENT
Start: 2022-11-26 | End: 2022-11-26 | Stop reason: HOSPADM

## 2022-11-26 RX ORDER — IOPAMIDOL 755 MG/ML
100 INJECTION, SOLUTION INTRAVASCULAR ONCE
Status: COMPLETED | OUTPATIENT
Start: 2022-11-26 | End: 2022-11-26

## 2022-11-26 RX ADMIN — IOPAMIDOL 100 ML: 755 INJECTION, SOLUTION INTRAVENOUS at 15:55

## 2022-11-26 RX ADMIN — SODIUM CHLORIDE 1000 ML: 0.9 INJECTION, SOLUTION INTRAVENOUS at 15:36

## 2022-11-26 ASSESSMENT — ACTIVITIES OF DAILY LIVING (ADL): ADLS_ACUITY_SCORE: 35

## 2022-11-26 NOTE — ED TRIAGE NOTES
Patient arrives today with complaints of generalized aches and pains, fever and chills. States has been going on for about 3 weeks. States has had 2 neg covid tests. Denies any use of tylenol or ibuprofen today afebrile.      Triage Assessment     Row Name 11/26/22 8934       Triage Assessment (Adult)    Airway WDL WDL       Respiratory WDL    Respiratory WDL WDL       Skin Circulation/Temperature WDL    Skin Circulation/Temperature WDL WDL       Cardiac WDL    Cardiac WDL WDL       Peripheral/Neurovascular WDL    Peripheral Neurovascular WDL WDL       Cognitive/Neuro/Behavioral WDL    Cognitive/Neuro/Behavioral WDL WDL

## 2022-11-26 NOTE — ED PROVIDER NOTES
Emergency Department Provider Note  : 1970 Age: 52 year old Sex: male MRN: [id number]    Chief Complaint   Patient presents with     Fatigue     Fever       Medical Decision Making / Assessment / Plan   52 year old male presenting with fatigue, some vague abdominal pain with feeling of incomplete emptying of his bowels.  Plan for some basic blood work and a CT of his abdomen.  ED Course as of 22 1845   Sat 2022   1542 Viral panel returned negative.  EMR confirmed that the patient had some cytochrome genetic testing to see how he would react to his mental health medications.  He did not have any basic labs done at his visit a few weeks ago.   1631 Labs returned unremarkable.  Waiting on CT read.    CT result returned negative for any acute pathology.  I explained to the patient all of his results today were pretty much unremarkable.  I do not have a good reason for his symptoms but did not find any emergency that would warrant intervention or admission.         Final diagnoses:   Other fatigue   Abdominal pain, generalized       Maxx Boykin MD  2022   Emergency Department    Sofia Paris is a 52 year old male who presents with 3 weeks of feeling fatigued, rundown and today an episode of sweats.  Was seen by was seen in clinic 3 weeks ago at which time his doctor checked some blood work to see how his medications are interacting with each other.  He has not heard those results yet.  Throughout this time he has had intermittent abdominal pain and an episode of vomiting.  States most time he does not have severe pain but his stomach feels queasy.  No constipation, no diarrhea.  He does not have much of an appetite so is stooling less.  States that it feels like he has difficulty stooling and does not completely empty his bowels.  Has not been around any sick contacts.  Has taken a COVID test x2 at home that were both negative.  He has had a few pound weight loss over this  "time.    Rarely drinks alcohol, no smoking.    I have reviewed the Medications, Allergies, Past Medical and Surgical History, and Social History in the Epic System and with family.    Review of Systems:  See HPI for pertinent positives and negatives. All other systems reviewed and found to be negative.      Objective   BP: 117/75  Pulse: 96  Temp: 99.3  F (37.4  C)  Resp: 17  Height: 172.7 cm (5' 8\")  Weight: 79.4 kg (175 lb)  SpO2: 96 %    Physical Exam:   General: awake and alert  Head: normocephalic, atraumatic  Eyes: conjugate gaze  ENT: moist mucous membranes  Chest/Respiratory: normal resp effort  Cardiovascular: warm and well perfused  Abdominal: soft, non-distended, non-tender  Extremities: mobility at baseline  Neurological: moving all extremities, normal speech, gait at baseline  Skin: warm and dry  Psychiatric: appropriate affect        Medical/Surgical History:  History reviewed. No pertinent past medical history.  History reviewed. No pertinent surgical history.    Medications:  Current Facility-Administered Medications   Medication     sodium chloride 0.9% infusion     Current Outpatient Medications   Medication     amLODIPine (NORVASC) 2.5 MG tablet     amLODIPine (NORVASC) 2.5 MG tablet     amphetamine-dextroamphetamine (ADDERALL XR) 15 MG 24 hr capsule     fluticasone (FLONASE) 50 MCG/ACT nasal spray     ibuprofen (ADVIL/MOTRIN) 800 MG tablet     loratadine-pseudoePHEDrine (CLARITIN-D 24-HOUR)  MG 24 hr tablet     olopatadine (PATADAY) 0.2 % ophthalmic solution     olopatadine (PATANOL) 0.1 % ophthalmic solution     pantoprazole (PROTONIX) 40 MG EC tablet     TRINTELLIX 20 MG tablet       Allergies:  Dogs and Pollen extract    Relevant labs, images, EKGs, Epic and outside hospital (if applicable) charts were reviewed. The findings, diagnosis, plan, and need for follow up were discussed with the patient/family. Nursing notes were reviewed.        Maxx Boykin MD  11/26/22 9612    "

## 2022-12-06 ENCOUNTER — APPOINTMENT (OUTPATIENT)
Dept: GENERAL RADIOLOGY | Facility: OTHER | Age: 52
End: 2022-12-06
Attending: EMERGENCY MEDICINE
Payer: COMMERCIAL

## 2022-12-06 ENCOUNTER — HOSPITAL ENCOUNTER (EMERGENCY)
Facility: OTHER | Age: 52
Discharge: HOME | End: 2022-12-07
Attending: INTERNAL MEDICINE | Admitting: INTERNAL MEDICINE
Payer: COMMERCIAL

## 2022-12-06 ENCOUNTER — APPOINTMENT (OUTPATIENT)
Dept: ULTRASOUND IMAGING | Facility: OTHER | Age: 52
End: 2022-12-06
Attending: INTERNAL MEDICINE
Payer: COMMERCIAL

## 2022-12-06 DIAGNOSIS — B17.9 ACUTE HEPATITIS: ICD-10-CM

## 2022-12-06 DIAGNOSIS — E86.0 DEHYDRATION: ICD-10-CM

## 2022-12-06 LAB
ALBUMIN SERPL BCG-MCNC: 2.8 G/DL (ref 3.5–5.2)
ALBUMIN UR-MCNC: NEGATIVE MG/DL
ALP SERPL-CCNC: 167 U/L (ref 40–129)
ALT SERPL W P-5'-P-CCNC: 102 U/L (ref 10–50)
ANION GAP SERPL CALCULATED.3IONS-SCNC: 12 MMOL/L (ref 7–15)
APPEARANCE UR: CLEAR
AST SERPL W P-5'-P-CCNC: 77 U/L (ref 10–50)
BASOPHILS # BLD MANUAL: 0 10E3/UL (ref 0–0.2)
BASOPHILS NFR BLD MANUAL: 0 %
BILIRUB SERPL-MCNC: 0.6 MG/DL
BILIRUB UR QL STRIP: NEGATIVE
BUN SERPL-MCNC: 9.7 MG/DL (ref 6–20)
CALCIUM SERPL-MCNC: 8.6 MG/DL (ref 8.6–10)
CHLORIDE SERPL-SCNC: 96 MMOL/L (ref 98–107)
COLOR UR AUTO: YELLOW
CREAT SERPL-MCNC: 0.82 MG/DL (ref 0.67–1.17)
CRP SERPL-MCNC: 28.62 MG/L
DEPRECATED HCO3 PLAS-SCNC: 23 MMOL/L (ref 22–29)
EOSINOPHIL # BLD MANUAL: 0.3 10E3/UL (ref 0–0.7)
EOSINOPHIL NFR BLD MANUAL: 2 %
ERYTHROCYTE [DISTWIDTH] IN BLOOD BY AUTOMATED COUNT: 13.4 % (ref 10–15)
FLUAV RNA SPEC QL NAA+PROBE: NEGATIVE
FLUBV RNA RESP QL NAA+PROBE: NEGATIVE
GFR SERPL CREATININE-BSD FRML MDRD: >90 ML/MIN/1.73M2
GLUCOSE SERPL-MCNC: 114 MG/DL (ref 70–99)
GLUCOSE UR STRIP-MCNC: NEGATIVE MG/DL
HCT VFR BLD AUTO: 38.1 % (ref 40–53)
HGB BLD-MCNC: 13.1 G/DL (ref 13.3–17.7)
HGB UR QL STRIP: NEGATIVE
HOLD SPECIMEN: NORMAL
HOLD SPECIMEN: NORMAL
KETONES UR STRIP-MCNC: NEGATIVE MG/DL
LACTATE SERPL-SCNC: 1.1 MMOL/L (ref 0.7–2)
LEUKOCYTE ESTERASE UR QL STRIP: NEGATIVE
LIPASE SERPL-CCNC: 38 U/L (ref 13–60)
LYMPHOCYTES # BLD MANUAL: 4.2 10E3/UL (ref 0.8–5.3)
LYMPHOCYTES NFR BLD MANUAL: 32 %
MCH RBC QN AUTO: 28.6 PG (ref 26.5–33)
MCHC RBC AUTO-ENTMCNC: 34.4 G/DL (ref 31.5–36.5)
MCV RBC AUTO: 83 FL (ref 78–100)
MONOCYTES # BLD MANUAL: 1.3 10E3/UL (ref 0–1.3)
MONOCYTES NFR BLD MANUAL: 10 %
MUCOUS THREADS #/AREA URNS LPF: PRESENT /LPF
MYELOCYTES # BLD MANUAL: 0.1 10E3/UL
MYELOCYTES NFR BLD MANUAL: 1 %
NEUTROPHILS # BLD MANUAL: 7.2 10E3/UL (ref 1.6–8.3)
NEUTROPHILS NFR BLD MANUAL: 55 %
NITRATE UR QL: NEGATIVE
PH UR STRIP: 6.5 [PH] (ref 5–9)
PLAT MORPH BLD: ABNORMAL
PLAT MORPH BLD: NORMAL
PLATELET # BLD AUTO: 183 10E3/UL (ref 150–450)
POTASSIUM SERPL-SCNC: 4.4 MMOL/L (ref 3.4–5.3)
PROCALCITONIN SERPL IA-MCNC: 0.24 NG/ML
PROT SERPL-MCNC: 6.3 G/DL (ref 6.4–8.3)
RBC # BLD AUTO: 4.58 10E6/UL (ref 4.4–5.9)
RBC MORPH BLD: ABNORMAL
RBC MORPH BLD: NORMAL
RBC URINE: 0 /HPF
RSV RNA SPEC NAA+PROBE: NEGATIVE
SARS-COV-2 RNA RESP QL NAA+PROBE: NEGATIVE
SODIUM SERPL-SCNC: 131 MMOL/L (ref 136–145)
SP GR UR STRIP: 1 (ref 1–1.03)
UROBILINOGEN UR STRIP-MCNC: NORMAL MG/DL
WBC # BLD AUTO: 13 10E3/UL (ref 4–11)
WBC URINE: <1 /HPF

## 2022-12-06 PROCEDURE — 80053 COMPREHEN METABOLIC PANEL: CPT | Performed by: INTERNAL MEDICINE

## 2022-12-06 PROCEDURE — 83690 ASSAY OF LIPASE: CPT | Performed by: INTERNAL MEDICINE

## 2022-12-06 PROCEDURE — 85027 COMPLETE CBC AUTOMATED: CPT | Performed by: INTERNAL MEDICINE

## 2022-12-06 PROCEDURE — 96361 HYDRATE IV INFUSION ADD-ON: CPT | Performed by: INTERNAL MEDICINE

## 2022-12-06 PROCEDURE — 86705 HEP B CORE ANTIBODY IGM: CPT | Performed by: EMERGENCY MEDICINE

## 2022-12-06 PROCEDURE — C9803 HOPD COVID-19 SPEC COLLECT: HCPCS | Performed by: INTERNAL MEDICINE

## 2022-12-06 PROCEDURE — 96365 THER/PROPH/DIAG IV INF INIT: CPT | Performed by: INTERNAL MEDICINE

## 2022-12-06 PROCEDURE — 87637 SARSCOV2&INF A&B&RSV AMP PRB: CPT | Performed by: INTERNAL MEDICINE

## 2022-12-06 PROCEDURE — 87340 HEPATITIS B SURFACE AG IA: CPT | Performed by: EMERGENCY MEDICINE

## 2022-12-06 PROCEDURE — 71045 X-RAY EXAM CHEST 1 VIEW: CPT | Mod: TC

## 2022-12-06 PROCEDURE — 250N000011 HC RX IP 250 OP 636: Performed by: INTERNAL MEDICINE

## 2022-12-06 PROCEDURE — 86140 C-REACTIVE PROTEIN: CPT | Performed by: INTERNAL MEDICINE

## 2022-12-06 PROCEDURE — 85007 BL SMEAR W/DIFF WBC COUNT: CPT | Performed by: INTERNAL MEDICINE

## 2022-12-06 PROCEDURE — 99284 EMERGENCY DEPT VISIT MOD MDM: CPT | Mod: CS | Performed by: INTERNAL MEDICINE

## 2022-12-06 PROCEDURE — 87040 BLOOD CULTURE FOR BACTERIA: CPT | Performed by: INTERNAL MEDICINE

## 2022-12-06 PROCEDURE — 86706 HEP B SURFACE ANTIBODY: CPT | Performed by: EMERGENCY MEDICINE

## 2022-12-06 PROCEDURE — 83605 ASSAY OF LACTIC ACID: CPT | Performed by: INTERNAL MEDICINE

## 2022-12-06 PROCEDURE — 81001 URINALYSIS AUTO W/SCOPE: CPT | Performed by: INTERNAL MEDICINE

## 2022-12-06 PROCEDURE — 36415 COLL VENOUS BLD VENIPUNCTURE: CPT | Performed by: INTERNAL MEDICINE

## 2022-12-06 PROCEDURE — 87637 SARSCOV2&INF A&B&RSV AMP PRB: CPT | Performed by: FAMILY MEDICINE

## 2022-12-06 PROCEDURE — 84145 PROCALCITONIN (PCT): CPT | Performed by: INTERNAL MEDICINE

## 2022-12-06 PROCEDURE — 99285 EMERGENCY DEPT VISIT HI MDM: CPT | Mod: 25,CS | Performed by: INTERNAL MEDICINE

## 2022-12-06 PROCEDURE — 258N000003 HC RX IP 258 OP 636: Performed by: INTERNAL MEDICINE

## 2022-12-06 PROCEDURE — 76705 ECHO EXAM OF ABDOMEN: CPT | Mod: TC

## 2022-12-06 PROCEDURE — 250N000013 HC RX MED GY IP 250 OP 250 PS 637: Performed by: FAMILY MEDICINE

## 2022-12-06 PROCEDURE — 86803 HEPATITIS C AB TEST: CPT | Performed by: EMERGENCY MEDICINE

## 2022-12-06 RX ORDER — ACETAMINOPHEN 500 MG
1000 TABLET ORAL ONCE
Status: COMPLETED | OUTPATIENT
Start: 2022-12-06 | End: 2022-12-06

## 2022-12-06 RX ADMIN — SODIUM CHLORIDE 1000 ML: 9 INJECTION, SOLUTION INTRAVENOUS at 20:38

## 2022-12-06 RX ADMIN — SODIUM CHLORIDE 1000 ML: 9 INJECTION, SOLUTION INTRAVENOUS at 19:26

## 2022-12-06 RX ADMIN — TAZOBACTAM SODIUM AND PIPERACILLIN SODIUM 4.5 G: 500; 4 INJECTION, SOLUTION INTRAVENOUS at 20:53

## 2022-12-06 RX ADMIN — ACETAMINOPHEN 1000 MG: 500 TABLET ORAL at 17:19

## 2022-12-06 ASSESSMENT — ACTIVITIES OF DAILY LIVING (ADL)
ADLS_ACUITY_SCORE: 35

## 2022-12-06 NOTE — ED TRIAGE NOTES
patient arrives from home with fevers and concerns with dehydration. States was here over a week ago and just has not felt any better. Is drinking water no emesis. 2 runny stools today. Vitals stable alert orientated fever on arrival      Triage Assessment     Row Name 12/06/22 4364       Triage Assessment (Adult)    Airway WDL WDL       Respiratory WDL    Respiratory WDL WDL       Skin Circulation/Temperature WDL    Skin Circulation/Temperature WDL X;temperature    Skin Temperature warm       Cardiac WDL    Cardiac WDL WDL       Peripheral/Neurovascular WDL    Peripheral Neurovascular WDL WDL       Cognitive/Neuro/Behavioral WDL    Cognitive/Neuro/Behavioral WDL WDL

## 2022-12-07 VITALS
WEIGHT: 175 LBS | OXYGEN SATURATION: 94 % | DIASTOLIC BLOOD PRESSURE: 78 MMHG | BODY MASS INDEX: 25.92 KG/M2 | HEIGHT: 69 IN | RESPIRATION RATE: 18 BRPM | HEART RATE: 90 BPM | SYSTOLIC BLOOD PRESSURE: 101 MMHG | TEMPERATURE: 98.3 F

## 2022-12-07 LAB
ALBUMIN SERPL BCG-MCNC: 2.6 G/DL (ref 3.5–5.2)
ALP SERPL-CCNC: 147 U/L (ref 40–129)
ALT SERPL W P-5'-P-CCNC: 86 U/L (ref 10–50)
ANION GAP SERPL CALCULATED.3IONS-SCNC: 8 MMOL/L (ref 7–15)
AST SERPL W P-5'-P-CCNC: 55 U/L (ref 10–50)
BASOPHILS # BLD AUTO: 0.2 10E3/UL (ref 0–0.2)
BASOPHILS NFR BLD AUTO: 1 %
BILIRUB SERPL-MCNC: 0.4 MG/DL
BUN SERPL-MCNC: 10.5 MG/DL (ref 6–20)
CALCIUM SERPL-MCNC: 8.1 MG/DL (ref 8.6–10)
CHLORIDE SERPL-SCNC: 98 MMOL/L (ref 98–107)
CREAT SERPL-MCNC: 0.94 MG/DL (ref 0.67–1.17)
DEPRECATED HCO3 PLAS-SCNC: 27 MMOL/L (ref 22–29)
EOSINOPHIL # BLD AUTO: 0.3 10E3/UL (ref 0–0.7)
EOSINOPHIL NFR BLD AUTO: 3 %
ERYTHROCYTE [DISTWIDTH] IN BLOOD BY AUTOMATED COUNT: 13.7 % (ref 10–15)
GFR SERPL CREATININE-BSD FRML MDRD: >90 ML/MIN/1.73M2
GLUCOSE SERPL-MCNC: 138 MG/DL (ref 70–99)
HBV CORE IGM SERPL QL IA: NONREACTIVE
HBV SURFACE AB SERPL IA-ACNC: 6.17 M[IU]/ML
HBV SURFACE AB SERPL IA-ACNC: NONREACTIVE M[IU]/ML
HBV SURFACE AG SERPL QL IA: NONREACTIVE
HCT VFR BLD AUTO: 37 % (ref 40–53)
HCV AB SERPL QL IA: NONREACTIVE
HGB BLD-MCNC: 12.3 G/DL (ref 13.3–17.7)
IMM GRANULOCYTES # BLD: 0.2 10E3/UL
IMM GRANULOCYTES NFR BLD: 2 %
LDH SERPL L TO P-CCNC: 356 U/L (ref 0–250)
LYMPHOCYTES # BLD AUTO: 5.9 10E3/UL (ref 0.8–5.3)
LYMPHOCYTES NFR BLD AUTO: 56 %
MCH RBC QN AUTO: 28.3 PG (ref 26.5–33)
MCHC RBC AUTO-ENTMCNC: 33.2 G/DL (ref 31.5–36.5)
MCV RBC AUTO: 85 FL (ref 78–100)
MONOCYTES # BLD AUTO: 0.8 10E3/UL (ref 0–1.3)
MONOCYTES NFR BLD AUTO: 8 %
MONOCYTES NFR BLD AUTO: NEGATIVE %
NEUTROPHILS # BLD AUTO: 3.1 10E3/UL (ref 1.6–8.3)
NEUTROPHILS NFR BLD AUTO: 30 %
NRBC # BLD AUTO: 0 10E3/UL
NRBC BLD AUTO-RTO: 0 /100
PLATELET # BLD AUTO: 180 10E3/UL (ref 150–450)
POTASSIUM SERPL-SCNC: 4.1 MMOL/L (ref 3.4–5.3)
PROT SERPL-MCNC: 5.7 G/DL (ref 6.4–8.3)
RBC # BLD AUTO: 4.35 10E6/UL (ref 4.4–5.9)
SODIUM SERPL-SCNC: 133 MMOL/L (ref 136–145)
WBC # BLD AUTO: 10.4 10E3/UL (ref 4–11)

## 2022-12-07 PROCEDURE — 250N000011 HC RX IP 250 OP 636: Performed by: INTERNAL MEDICINE

## 2022-12-07 PROCEDURE — 83615 LACTATE (LD) (LDH) ENZYME: CPT | Performed by: INTERNAL MEDICINE

## 2022-12-07 PROCEDURE — 258N000003 HC RX IP 258 OP 636: Performed by: EMERGENCY MEDICINE

## 2022-12-07 PROCEDURE — 86308 HETEROPHILE ANTIBODY SCREEN: CPT | Performed by: INTERNAL MEDICINE

## 2022-12-07 PROCEDURE — 36415 COLL VENOUS BLD VENIPUNCTURE: CPT | Performed by: EMERGENCY MEDICINE

## 2022-12-07 PROCEDURE — 82040 ASSAY OF SERUM ALBUMIN: CPT | Performed by: EMERGENCY MEDICINE

## 2022-12-07 PROCEDURE — 250N000013 HC RX MED GY IP 250 OP 250 PS 637: Performed by: EMERGENCY MEDICINE

## 2022-12-07 PROCEDURE — 86038 ANTINUCLEAR ANTIBODIES: CPT | Performed by: INTERNAL MEDICINE

## 2022-12-07 PROCEDURE — 85025 COMPLETE CBC W/AUTO DIFF WBC: CPT | Performed by: EMERGENCY MEDICINE

## 2022-12-07 PROCEDURE — 80053 COMPREHEN METABOLIC PANEL: CPT | Performed by: EMERGENCY MEDICINE

## 2022-12-07 PROCEDURE — 86665 EPSTEIN-BARR CAPSID VCA: CPT | Performed by: INTERNAL MEDICINE

## 2022-12-07 PROCEDURE — 99283 EMERGENCY DEPT VISIT LOW MDM: CPT | Mod: 25 | Performed by: INTERNAL MEDICINE

## 2022-12-07 PROCEDURE — 250N000011 HC RX IP 250 OP 636: Performed by: EMERGENCY MEDICINE

## 2022-12-07 PROCEDURE — 36415 COLL VENOUS BLD VENIPUNCTURE: CPT | Performed by: INTERNAL MEDICINE

## 2022-12-07 PROCEDURE — 86645 CMV ANTIBODY IGM: CPT | Performed by: INTERNAL MEDICINE

## 2022-12-07 PROCEDURE — 87040 BLOOD CULTURE FOR BACTERIA: CPT | Performed by: INTERNAL MEDICINE

## 2022-12-07 PROCEDURE — 96368 THER/DIAG CONCURRENT INF: CPT | Performed by: INTERNAL MEDICINE

## 2022-12-07 PROCEDURE — 96366 THER/PROPH/DIAG IV INF ADDON: CPT | Performed by: INTERNAL MEDICINE

## 2022-12-07 PROCEDURE — 250N000013 HC RX MED GY IP 250 OP 250 PS 637: Performed by: FAMILY MEDICINE

## 2022-12-07 RX ORDER — DIPHENHYDRAMINE HCL 25 MG
50 CAPSULE ORAL ONCE
Status: COMPLETED | OUTPATIENT
Start: 2022-12-07 | End: 2022-12-07

## 2022-12-07 RX ORDER — ACETAMINOPHEN 500 MG
1000 TABLET ORAL ONCE
Status: COMPLETED | OUTPATIENT
Start: 2022-12-07 | End: 2022-12-07

## 2022-12-07 RX ADMIN — TAZOBACTAM SODIUM AND PIPERACILLIN SODIUM 4.5 G: 500; 4 INJECTION, SOLUTION INTRAVENOUS at 02:33

## 2022-12-07 RX ADMIN — VANCOMYCIN HYDROCHLORIDE 1250 MG: 1 INJECTION, POWDER, LYOPHILIZED, FOR SOLUTION INTRAVENOUS at 00:49

## 2022-12-07 RX ADMIN — DIPHENHYDRAMINE HYDROCHLORIDE 50 MG: 25 CAPSULE ORAL at 02:33

## 2022-12-07 RX ADMIN — ACETAMINOPHEN 1000 MG: 500 TABLET ORAL at 08:18

## 2022-12-07 ASSESSMENT — ACTIVITIES OF DAILY LIVING (ADL)
ADLS_ACUITY_SCORE: 35

## 2022-12-07 NOTE — CONSULTS
[facility]  Consult Note - Hospitalist Service  Date of Admission:  12/6/2022  Consult Requested by: Dr. Quinn  Reason for Consult: fever, consider admission    Assessment & Plan   Wellington Moreno is a 52 year old male admitted on 12/6/2022. He presents to the ED with 2 weeks of progressive weakness, lethargy, slight weight loss and sweats with low grade fever.    Extensive work-up over the past 2 weeks shows the following:    Normal TSH  Negative HIV  negative babesia, lyme, anaplasma  Normal total testosterone  Negative rsv, covid, influenza  Normal UA  Normal chest xray   Normal CT of the abdomen and pelvis   Abdomen US showing mild hepatic steatosis  Normal kidney function    POSITIVES  Mild tranaminitis  Elevated CRP    PENDING   Hep C  Hep B    Differential diagnosis includes   CMV  EBV  Lymphoma - less likely  Hep C - less likely  Rheumatologic illness  Endocarditis - less likely    Recommend:  LDH, CMV IgM, EBV IgM, repeat blood cultures, JESSI. Close follow up with Dr. Llanos in clinic. Routine cancer screening - colonoscopy 2019. Discussed observation for intravenous fluids today, but patient opted for home.            The patient's care was discussed with the Attending Physician, Dr. Quinn and Patient.    Lauro Dorsey MD  [facility]  Securely message with the Vocera Web Console (learn more here)  Text page via [facility] Paging/[facility]       Hospitalist Service    Clinically Significant Risk Factors Present on Admission       Chief Complaint   Fever and lethargy    History is obtained from the patient    History of Present Illness   Wellington Moreno is a 52 year old male admitted on 12/6/2022. He presents to the ED with 2 weeks of progressive weakness, lethargy, slight weight loss and sweats with low grade fever. He was treated empirically with doxycycline for suspected tick borne illness. Tick panel negative.  He has felt poorly and been in bed, but able to  get up, eat. No recent travel, no sick contacts. No joint pain, no rash.       Review of Systems   CONSTITUTIONAL:POSITIVE  for chills, fever, malaise and sweats  INTEGUMENTARY/SKIN: NEGATIVE for worrisome rashes, moles or lesions  EYES: NEGATIVE for vision changes or irritation  ENT/MOUTH: NEGATIVE for ear, mouth and throat problems  RESP: NEGATIVE for significant cough or SOB  CV: NEGATIVE for chest pain, palpitations or peripheral edema  GI: NEGATIVE for nausea, abdominal pain, heartburn, or change in bowel habits  : NEGATIVE for frequency, dysuria, or hematuria  MUSCULOSKELETAL: NEGATIVE for significant arthralgias or myalgia  NEURO: NEGATIVE for weakness, dizziness or paresthesias  ENDOCRINE: NEGATIVE for temperature intolerance, skin/hair changes  HEME: NEGATIVE for bleeding problems  PSYCHIATRIC: NEGATIVE for changes in mood or affect    Past Medical History    I have reviewed this patient's medical history and updated it with pertinent information if needed.   History reviewed. No pertinent past medical history.    Past Surgical History   I have reviewed this patient's surgical history and updated it with pertinent information if needed.  History reviewed. No pertinent surgical history.    Social History   I have reviewed this patient's social history and updated it with pertinent information if needed.  Social History     Tobacco Use     Smoking status: Never     Smokeless tobacco: Never     Tobacco comments:     no ecig   Substance Use Topics     Alcohol use: Yes     Comment: rare     Drug use: Yes     Types: Marijuana     Comment: Medical Marijuana at night to sleep       Family History   Negative for autoimmune disease and malignancy    Medications   I have reviewed this patient's current medications    Allergies   Allergies   Allergen Reactions     Dogs      Pollen Extract      Trees, flowers grass       Physical Exam   Vital Signs: Temp: (!) 101.5  F (38.6  C) Temp src: Tympanic BP: 103/74 Pulse: 97    Resp: 18 SpO2: 94 % O2 Device: None (Room air) Oxygen Delivery: 2 LPM  Weight: 175 lbs 0 oz    Constitutional: In no apparent distress  Eyes: pupils reactive, extraocular movements intact. Anicteric sclera.   HEENT: Oropharynx nonerythematous. Neck supple, no JVD.  Respiratory: no crackles noted, no wheezes.  Cardiovascular: regular, no murmur. no lower extremity edema.  GI: soft, non-tender, bowel sounds present.  Lymph/Hematologic: no cervical or supraclavicular LAD.  Genitourinary: deferred  Skin: no rashes, or sores  Musculoskeletal: no joint erythema or swelling  Neurologic: cranial nerves symmetric. Neuro exam nonfocal  Psychiatric: alert and oriented x3. Interactive.       Data   Results for orders placed or performed during the hospital encounter of 12/06/22 (from the past 24 hour(s))   Symptomatic Influenza A/B & SARS-CoV2 (COVID-19) Virus PCR Multiplex Nose    Specimen: Nose; Swab   Result Value Ref Range    Influenza A PCR Negative Negative    Influenza B PCR Negative Negative    RSV PCR Negative Negative    SARS CoV2 PCR Negative Negative    Narrative    Testing was performed using the Xpert Xpress CoV2/Flu/RSV Assay on the [employer] GeneXpert Instrument. This test should be ordered for the detection of SARS-CoV-2 and influenza viruses in individuals who meet clinical and/or epidemiological criteria. Test performance is unknown in asymptomatic patients. This test is for in vitro diagnostic use under the FDA EUA for laboratories certified under CLIA to perform high or moderate complexity testing. This test has not been FDA cleared or approved. A negative result does not rule out the presence of PCR inhibitors in the specimen or target RNA in concentration below the limit of detection for the assay. If only one viral target is positive but coinfection with multiple targets is suspected, the sample should be re-tested with another FDA cleared, approved, or authorized test, if coinfection would change  clinical management. This test was validated by the [facility]. These laboratories are certified under the Clinical Laboratory Improvement Amendments of 1988 (CLIA-88) as qualified to perform high complexity laboratory testing.   CBC with platelets differential    Narrative    The following orders were created for panel order CBC with platelets differential.  Procedure                               Abnormality         Status                     ---------                               -----------         ------                     CBC with platelets and d...[[id number]]  Abnormal            Final result               RBC and Platelet Morphology[[id number]]                      Final result               Manual Differential[[id number]]          Abnormal            Edited Result - FINAL      Manual Differential[[id number]]                                                           Please view results for these tests on the individual orders.   Comprehensive metabolic panel   Result Value Ref Range    Sodium 131 (L) 136 - 145 mmol/L    Potassium 4.4 3.4 - 5.3 mmol/L    Chloride 96 (L) 98 - 107 mmol/L    Carbon Dioxide (CO2) 23 22 - 29 mmol/L    Anion Gap 12 7 - 15 mmol/L    Urea Nitrogen 9.7 6.0 - 20.0 mg/dL    Creatinine 0.82 0.67 - 1.17 mg/dL    Calcium 8.6 8.6 - 10.0 mg/dL    Glucose 114 (H) 70 - 99 mg/dL    Alkaline Phosphatase 167 (H) 40 - 129 U/L    AST 77 (H) 10 - 50 U/L     (H) 10 - 50 U/L    Protein Total 6.3 (L) 6.4 - 8.3 g/dL    Albumin 2.8 (L) 3.5 - 5.2 g/dL    Bilirubin Total 0.6 <=1.2 mg/dL    GFR Estimate >90 >60 mL/min/1.73m2   CRP inflammation   Result Value Ref Range    CRP Inflammation 28.62 (H) <5.00 mg/L   Procalcitonin   Result Value Ref Range    Procalcitonin 0.24 (H) <0.05 ng/mL   Extra Tube    Narrative    The following orders were created for panel order Extra Tube.  Procedure                               Abnormality         Status                     ---------                                -----------         ------                     Extra Blue Top Tube[[id number]]                              Final result               Extra Serum Separator Tu...[[id number]]                      Final result                 Please view results for these tests on the individual orders.   Extra Serum Separator Tube (SST)   Result Value Ref Range    Hold Specimen [facility]    CBC with platelets and differential   Result Value Ref Range    WBC Count 13.0 (H) 4.0 - 11.0 10e3/uL    RBC Count 4.58 4.40 - 5.90 10e6/uL    Hemoglobin 13.1 (L) 13.3 - 17.7 g/dL    Hematocrit 38.1 (L) 40.0 - 53.0 %    MCV 83 78 - 100 fL    MCH 28.6 26.5 - 33.0 pg    MCHC 34.4 31.5 - 36.5 g/dL    RDW 13.4 10.0 - 15.0 %    Platelet Count 183 150 - 450 10e3/uL   Manual Differential   Result Value Ref Range    % Neutrophils 55 %    % Lymphocytes 32 %    % Monocytes 10 %    % Eosinophils 2 %    % Basophils 0 %    % Myelocytes 1 %    Absolute Neutrophils 7.2 1.6 - 8.3 10e3/uL    Absolute Lymphocytes 4.2 0.8 - 5.3 10e3/uL    Absolute Monocytes 1.3 0.0 - 1.3 10e3/uL    Absolute Eosinophils 0.3 0.0 - 0.7 10e3/uL    Absolute Basophils 0.0 0.0 - 0.2 10e3/uL    Absolute Myelocytes 0.1 (H) <=0.0 10e3/uL    RBC Morphology Confirmed RBC Indices     Platelet Assessment  Automated Count Confirmed. Platelet morphology is normal.     Automated Count Confirmed. Platelet morphology is normal.   RBC and Platelet Morphology   Result Value Ref Range    Platelet Assessment  Automated Count Confirmed. Platelet morphology is normal.     Automated Count Confirmed. Platelet morphology is normal.    RBC Morphology Confirmed RBC Indices    Extra Blue Top Tube   Result Value Ref Range    Hold Specimen [facility]    UA with Microscopic reflex to Culture    Specimen: Urine, Clean Catch   Result Value Ref Range    Color Urine Yellow Colorless, Straw, Light Yellow, Yellow    Appearance Urine Clear Clear    Glucose Urine Negative Negative mg/dL    Bilirubin Urine Negative  Negative    Ketones Urine Negative Negative mg/dL    Specific Gravity Urine 1.005 1.000 - 1.030    Blood Urine Negative Negative    pH Urine 6.5 5.0 - 9.0    Protein Albumin Urine Negative Negative mg/dL    Urobilinogen Urine Normal Normal, 2.0 mg/dL    Nitrite Urine Negative Negative    Leukocyte Esterase Urine Negative Negative    Mucus Urine Present (A) None Seen /LPF    RBC Urine 0 <=2 /HPF    WBC Urine <1 <=5 /HPF    Narrative    Urine Culture not indicated   Lactic acid whole blood   Result Value Ref Range    Lactic Acid 1.1 0.7 - 2.0 mmol/L   Lipase   Result Value Ref Range    Lipase 38 13 - 60 U/L   US Abdomen Limited    Narrative    PROCEDURE INFORMATION:   Exam: US Abdomen, Limited; Right Upper Quadrant   Exam date and time: 12/6/2022 8:56 PM   Age: 52 years old   Clinical indication: Acute hepatitis, and fever.     TECHNIQUE:   Imaging protocol: Real time ultrasound of the abdomen with image documentation.   Limited exam focused on the right upper quadrant.     COMPARISON:   CT ABDOMEN PELVIS W CONTRAST 11/26/2022 3:47 PM     FINDINGS:   Liver: Mild steatosis. No hepatomegaly of 17. cm. No mass. MPV flow is   hepatopetal.  Gallbladder:  The No gallstones, gallbladder wall thickening/ (2.0 mm) or   pericholecystic fluid fluid. Negative sonographic Diana's sign.   Common bile duct: Normal. No stones. No dilation/(6.0 mm).   Pancreas:  Obscured by overlying bowel gas.   Aorta/IVC: Normal. Proximal2.5 cm/distal aorta 1.8 cm.  Right kidney: Normal. No calculi, obstruction, cyst or solid mass.       Impression    IMPRESSION:   Mild hepatic steatosis. No cholelithiasis or biliary dilation.    THIS DOCUMENT HAS BEEN ELECTRONICALLY SIGNED BY CECILE VILLAVICENCIO DO   XR Chest Port 1 View    Narrative    PROCEDURE INFORMATION:   Exam: XR Chest   Exam date and time: 12/6/2022 11:22 PM   Age: 52 years old   Clinical indication: Fever; Prior surgery; Surgery date: 6+ months; Surgery   type: Implant for sleep apnea      TECHNIQUE:   Imaging protocol: Radiologic exam of the chest.   Views: 1 view.     COMPARISON:   CR XR CHEST 2 VW 12/7/2019 4:49 PM     FINDINGS:   Tubes, catheters and devices: Sleep apnea implant device projects over the   right hilar region.     Lungs: Minimal linear bibasilar atelectasis. No consolidation.   Pleural spaces: Unremarkable. No pleural effusion. No pneumothorax.   Heart/Mediastinum: Unremarkable. No cardiomegaly.   Bones/joints: Unremarkable.       Impression    IMPRESSION:   No acute findings.     THIS DOCUMENT HAS BEEN ELECTRONICALLY SIGNED BY AMBER SWEENEY MD   Comprehensive metabolic panel   Result Value Ref Range    Sodium 133 (L) 136 - 145 mmol/L    Potassium 4.1 3.4 - 5.3 mmol/L    Chloride 98 98 - 107 mmol/L    Carbon Dioxide (CO2) 27 22 - 29 mmol/L    Anion Gap 8 7 - 15 mmol/L    Urea Nitrogen 10.5 6.0 - 20.0 mg/dL    Creatinine 0.94 0.67 - 1.17 mg/dL    Calcium 8.1 (L) 8.6 - 10.0 mg/dL    Glucose 138 (H) 70 - 99 mg/dL    Alkaline Phosphatase 147 (H) 40 - 129 U/L    AST 55 (H) 10 - 50 U/L    ALT 86 (H) 10 - 50 U/L    Protein Total 5.7 (L) 6.4 - 8.3 g/dL    Albumin 2.6 (L) 3.5 - 5.2 g/dL    Bilirubin Total 0.4 <=1.2 mg/dL    GFR Estimate >90 >60 mL/min/1.73m2   CBC with platelets differential    Narrative    The following orders were created for panel order CBC with platelets differential.  Procedure                               Abnormality         Status                     ---------                               -----------         ------                     CBC with platelets and d...[[id number]]  Abnormal            Final result                 Please view results for these tests on the individual orders.   CBC with platelets and differential   Result Value Ref Range    WBC Count 10.4 4.0 - 11.0 10e3/uL    RBC Count 4.35 (L) 4.40 - 5.90 10e6/uL    Hemoglobin 12.3 (L) 13.3 - 17.7 g/dL    Hematocrit 37.0 (L) 40.0 - 53.0 %    MCV 85 78 - 100 fL    MCH 28.3 26.5 - 33.0 pg    MCHC 33.2  31.5 - 36.5 g/dL    RDW 13.7 10.0 - 15.0 %    Platelet Count 180 150 - 450 10e3/uL    % Neutrophils 30 %    % Lymphocytes 56 %    % Monocytes 8 %    % Eosinophils 3 %    % Basophils 1 %    % Immature Granulocytes 2 %    NRBCs per 100 WBC 0 <1 /100    Absolute Neutrophils 3.1 1.6 - 8.3 10e3/uL    Absolute Lymphocytes 5.9 (H) 0.8 - 5.3 10e3/uL    Absolute Monocytes 0.8 0.0 - 1.3 10e3/uL    Absolute Eosinophils 0.3 0.0 - 0.7 10e3/uL    Absolute Basophils 0.2 0.0 - 0.2 10e3/uL    Absolute Immature Granulocytes 0.2 <=0.4 10e3/uL    Absolute NRBCs 0.0 10e3/uL   Lactate Dehydrogenase   Result Value Ref Range    Lactate Dehydrogenase 356 (H) 0 - 250 U/L   Mononucleosis screen   Result Value Ref Range    Mononucleosis Screen Negative Negative

## 2022-12-07 NOTE — ED PROVIDER NOTES
Emergency Department Provider Note  : 1970 Age: 52 year old Sex: male MRN: [id number]    Chief Complaint   Patient presents with     Dehydration       Medical Decision Making / Assessment / Plan   52 year old male presenting with febrile illness, possible sepsis.  -- Final diagnosis pending.    ED Course as of 22 231   Tue Dec 06, 2022   1920 Patient evaluated.  Unknown source of fever/infection.  Had negative Ehrlichia, Babesia, anaplasmosis and Lyme disease testing resulting on .  He was seen in clinic at [facility] on .  There was concern for tick infection and subsequently started on twice daily antibiotic which patient is uncertain the name of.  Has been sick in bed with myalgias, almost bedridden for the last 12 days.  CT scan on  showed distended gallbladder.  Liver enzymes today are all elevated with elevated CRP, mildly elevated procalcitonin at 2.24, white count is 13,000.  He is hypobulimic with sodium and chloride low.  Elevated alkaline phosphatase, AST, ALT, low albumin.  Creatinine concern for possible sepsis.  Lactate, blood cultures ordered.  2 L IV fluid boluses ordered.  Start Zosyn.  Check urinalysis.  COVID, influenza, RSV negative.  Check abdominal ultrasound.    Ultrasound returned showing: Mild hepatic steatosis.  No cholelithiasis or biliary dilatation.    Patient has had about 5 days of doxycycline, prescribed on .  100 mg twice daily.    UA appears negative.    2244 Will probably need hospitalization.   All local hospitals are on divert.   Disposition and final diagnosis pending at time of routine change of shift. Signed out to Dr. Moreno.         New Prescriptions    No medications on file       Final diagnoses:   Sepsis, due to unspecified organism, unspecified whether acute organ dysfunction present (H)   Acute hepatitis       Jose Samano MD  2022   Emergency Department    Sofia Paris is a 52 year old male who presents at   6:32 PM with feeling poor for the past couple of weeks.  He was in the emergency room about a week ago and is not really feeling any better since that time.  He has been having night sweats and chills and fevers, body aches.  States that the last few weeks he has been having few clumps of mucus in his stools.  His stomach feels very unsettled and cramping.  Indicates that he had 2 runny stools earlier today.    He was seen on 12/1 and tested for ehrlichiosis, babesiosis, anaplasmosis, Lyme disease, tested negative.  He was empirically started on some doxycycline 100 mg twice daily on 12/1.  He has taken doxycycline twice daily since that time, approximately 5 days worth of antibiotics.    He presents with oxygen saturations 92%, 101.2 degrees upon triage arrival.    CT scan on 11/26 showed a distended gallbladder.    States that he missed work for the last 12 days and has been almost bedridden this time.    He does have some dogs at home.  No known tick bites.    He has no signs of meningitis.  Does complain of muscles cramping.  No known rashes.    No recent travel outside of the [facility].    States that his eyes are sore and tired he has a hard time sort of focusing.    I have reviewed the Medications, Allergies, Past Medical and Surgical History, and Social History in the [facility] and with family.    Review of Systems:  Please see Subjective / HPI for pertinent positives and negatives. All other systems reviewed and found to be negative.      Objective     Patient Vitals for the past 24 hrs:   BP Temp Temp src Pulse Resp SpO2 Height Weight   12/06/22 2249 111/42 -- -- 90 12 94 % -- --   12/06/22 2230 114/67 -- -- 86 -- 93 % -- --   12/06/22 2215 112/71 -- -- 89 -- 95 % -- --   12/06/22 2200 118/68 -- -- 90 11 93 % -- --   12/06/22 2145 121/72 -- -- 92 16 91 % -- --   12/06/22 2130 125/71 -- -- 93 12 95 % -- --   12/06/22 2045 127/77 -- -- 90 -- 95 % -- --   12/06/22 2030 114/76 97.9  F (36.6  C) Tympanic 93 -- 95  "% -- --   12/06/22 2015 121/76 -- -- 92 -- 94 % -- --   12/06/22 1915 122/72 -- -- 97 -- 93 % -- --   12/06/22 1900 121/73 -- -- 96 -- 93 % -- --   12/06/22 1845 117/78 -- -- 97 -- 93 % -- --   12/06/22 1834 -- 99.1  F (37.3  C) Tympanic -- -- -- -- --   12/06/22 1625 123/76 -- -- -- -- -- -- --   12/06/22 1623 -- (!) 101.2  F (38.4  C) Tympanic 99 19 99 % 1.753 m (5' 9\") 79.4 kg (175 lb)       Physical Exam:   General: Awake, alert, somewhat pale and ill appearing.   Head: Normocephalic, atraumatic.  Eyes: Conjugate gaze. No jaundice.   ENT: Moist membranes, external ear appears normal.   Chest/Respiratory: Equal chest rise, clear bilaterally.  Cardiovascular: Peripheral pulses present, tachycardic, regular rhythm.  Abdominal: Soft, non-distended, minimal RUQ tenderness.  Extremities: No obvious deformity. Complains of generalized myalgias.   Neurological: GCS 15, moving all extremities without gross deficit.  Skin: Pale. no rashes, lesions, or bruising.   Psychiatric: Ill appearing.     Procedures / Critical Care   Procedures    Aggregate Critical Care Time:  is 65 minutes.  This was the time seeing the patient at the bedside while the patient was critical.  My time did not include any pertinent procedures or activities that did not contribute to the patient's care while the patient was critical.      Orders Placed This Encounter   Procedures     US Abdomen Limited     XR Chest Port 1 View     Symptomatic Influenza A/B & SARS-CoV2 (COVID-19) Virus PCR Multiplex Nose     Comprehensive metabolic panel     CRP inflammation     Procalcitonin     Extra Tube     Extra Blue Top Tube     Extra Serum Separator Tube (SST)     CBC with platelets and differential     UA with Microscopic reflex to Culture     Lactic acid whole blood     Lipase     Manual Differential     RBC and Platelet Morphology     Comprehensive metabolic panel     Peripheral IV catheter     Pulse oximetry nursing     Cardiac Continuous Monitoring     " Strict intake and output     Peripheral IV catheter     Notify Provider     Notify Provider     Oxygen: Nasal cannula, Oxygen mask     CBC with platelets differential       RESULTS:  Results for orders placed or performed during the hospital encounter of 12/06/22   US Abdomen Limited     Status: None    Narrative    PROCEDURE INFORMATION:   Exam: US Abdomen, Limited; Right Upper Quadrant   Exam date and time: 12/6/2022 8:56 PM   Age: 52 years old   Clinical indication: Acute hepatitis, and fever.     TECHNIQUE:   Imaging protocol: Real time ultrasound of the abdomen with image documentation.   Limited exam focused on the right upper quadrant.     COMPARISON:   CT ABDOMEN PELVIS W CONTRAST 11/26/2022 3:47 PM     FINDINGS:   Liver: Mild steatosis. No hepatomegaly of 17. cm. No mass. MPV flow is   hepatopetal.  Gallbladder:  The No gallstones, gallbladder wall thickening/ (2.0 mm) or   pericholecystic fluid fluid. Negative sonographic Diana's sign.   Common bile duct: Normal. No stones. No dilation/(6.0 mm).   Pancreas:  Obscured by overlying bowel gas.   Aorta/IVC: Normal. Proximal2.5 cm/distal aorta 1.8 cm.  Right kidney: Normal. No calculi, obstruction, cyst or solid mass.       Impression    IMPRESSION:   Mild hepatic steatosis. No cholelithiasis or biliary dilation.    THIS DOCUMENT HAS BEEN ELECTRONICALLY SIGNED BY DO Justin GRANADOS Influenza A/B & SARS-CoV2 (COVID-19) Virus PCR Multiplex Nose     Status: Normal    Specimen: Nose; Swab   Result Value Ref Range    Influenza A PCR Negative Negative    Influenza B PCR Negative Negative    RSV PCR Negative Negative    SARS CoV2 PCR Negative Negative    Narrative    Testing was performed using the Xpert Xpress CoV2/Flu/RSV Assay on the [employer] GeneXpert Instrument. This test should be ordered for the detection of SARS-CoV-2 and influenza viruses in individuals who meet clinical and/or epidemiological criteria. Test performance is unknown in  asymptomatic patients. This test is for in vitro diagnostic use under the FDA EUA for laboratories certified under CLIA to perform high or moderate complexity testing. This test has not been FDA cleared or approved. A negative result does not rule out the presence of PCR inhibitors in the specimen or target RNA in concentration below the limit of detection for the assay. If only one viral target is positive but coinfection with multiple targets is suspected, the sample should be re-tested with another FDA cleared, approved, or authorized test, if coinfection would change clinical management. This test was validated by the [facility]. These laboratories are certified under the Clinical Laboratory Improvement Amendments of 1988 (CLIA-88) as qualified to perform high complexity laboratory testing.   Comprehensive metabolic panel     Status: Abnormal   Result Value Ref Range    Sodium 131 (L) 136 - 145 mmol/L    Potassium 4.4 3.4 - 5.3 mmol/L    Chloride 96 (L) 98 - 107 mmol/L    Carbon Dioxide (CO2) 23 22 - 29 mmol/L    Anion Gap 12 7 - 15 mmol/L    Urea Nitrogen 9.7 6.0 - 20.0 mg/dL    Creatinine 0.82 0.67 - 1.17 mg/dL    Calcium 8.6 8.6 - 10.0 mg/dL    Glucose 114 (H) 70 - 99 mg/dL    Alkaline Phosphatase 167 (H) 40 - 129 U/L    AST 77 (H) 10 - 50 U/L     (H) 10 - 50 U/L    Protein Total 6.3 (L) 6.4 - 8.3 g/dL    Albumin 2.8 (L) 3.5 - 5.2 g/dL    Bilirubin Total 0.6 <=1.2 mg/dL    GFR Estimate >90 >60 mL/min/1.73m2   CRP inflammation     Status: Abnormal   Result Value Ref Range    CRP Inflammation 28.62 (H) <5.00 mg/L   Procalcitonin     Status: Abnormal   Result Value Ref Range    Procalcitonin 0.24 (H) <0.05 ng/mL   Extra Tube     Status: None    Narrative    The following orders were created for panel order Extra Tube.  Procedure                               Abnormality         Status                     ---------                               -----------         ------                      Extra Blue Top Tube[[id number]]                              Final result               Extra Serum Separator Tu...[[id number]]                      Final result                 Please view results for these tests on the individual orders.   Extra Blue Top Tube     Status: None   Result Value Ref Range    Hold Specimen JIC    Extra Serum Separator Tube (SST)     Status: None   Result Value Ref Range    Hold Specimen JIC    CBC with platelets and differential     Status: Abnormal   Result Value Ref Range    WBC Count 13.0 (H) 4.0 - 11.0 10e3/uL    RBC Count 4.58 4.40 - 5.90 10e6/uL    Hemoglobin 13.1 (L) 13.3 - 17.7 g/dL    Hematocrit 38.1 (L) 40.0 - 53.0 %    MCV 83 78 - 100 fL    MCH 28.6 26.5 - 33.0 pg    MCHC 34.4 31.5 - 36.5 g/dL    RDW 13.4 10.0 - 15.0 %    Platelet Count 183 150 - 450 10e3/uL   UA with Microscopic reflex to Culture     Status: Abnormal    Specimen: Urine, Clean Catch   Result Value Ref Range    Color Urine Yellow Colorless, Straw, Light Yellow, Yellow    Appearance Urine Clear Clear    Glucose Urine Negative Negative mg/dL    Bilirubin Urine Negative Negative    Ketones Urine Negative Negative mg/dL    Specific Gravity Urine 1.005 1.000 - 1.030    Blood Urine Negative Negative    pH Urine 6.5 5.0 - 9.0    Protein Albumin Urine Negative Negative mg/dL    Urobilinogen Urine Normal Normal, 2.0 mg/dL    Nitrite Urine Negative Negative    Leukocyte Esterase Urine Negative Negative    Mucus Urine Present (A) None Seen /LPF    RBC Urine 0 <=2 /HPF    WBC Urine <1 <=5 /HPF    Narrative    Urine Culture not indicated   Lactic acid whole blood     Status: Normal   Result Value Ref Range    Lactic Acid 1.1 0.7 - 2.0 mmol/L   Lipase     Status: Normal   Result Value Ref Range    Lipase 38 13 - 60 U/L   Manual Differential     Status: Abnormal   Result Value Ref Range    % Neutrophils 55 %    % Lymphocytes 32 %    % Monocytes 10 %    % Eosinophils 2 %    % Basophils 0 %    % Myelocytes 1 %    Absolute  Neutrophils 7.2 1.6 - 8.3 10e3/uL    Absolute Lymphocytes 4.2 0.8 - 5.3 10e3/uL    Absolute Monocytes 1.3 0.0 - 1.3 10e3/uL    Absolute Eosinophils 0.3 0.0 - 0.7 10e3/uL    Absolute Basophils 0.0 0.0 - 0.2 10e3/uL    Absolute Myelocytes 0.1 (H) <=0.0 10e3/uL    RBC Morphology Confirmed RBC Indices     Platelet Assessment  Automated Count Confirmed. Platelet morphology is normal.     Automated Count Confirmed. Platelet morphology is normal.   RBC and Platelet Morphology     Status: None   Result Value Ref Range    Platelet Assessment  Automated Count Confirmed. Platelet morphology is normal.     Automated Count Confirmed. Platelet morphology is normal.    RBC Morphology Confirmed RBC Indices    CBC with platelets differential     Status: Abnormal    Narrative    The following orders were created for panel order CBC with platelets differential.  Procedure                               Abnormality         Status                     ---------                               -----------         ------                     CBC with platelets and d...[[id number]]  Abnormal            Final result               RBC and Platelet Morphology[[id number]]                      Final result               Manual Differential[[id number]]          Abnormal            Edited Result - FINAL      Manual Differential[[id number]]                                                           Please view results for these tests on the individual orders.           Medical/Surgical History:  History reviewed. No pertinent past medical history.  History reviewed. No pertinent surgical history.    Medications:  Current Facility-Administered Medications   Medication     [START ON 12/7/2022] piperacillin-tazobactam (ZOSYN) intermittent infusion 4.5 g     sodium chloride (PF) 0.9% PF flush 3 mL     sodium chloride (PF) 0.9% PF flush 3 mL     Current Outpatient Medications   Medication     amLODIPine (NORVASC) 2.5 MG tablet     amLODIPine (NORVASC) 2.5 MG  tablet     amphetamine-dextroamphetamine (ADDERALL XR) 15 MG 24 hr capsule     fluticasone (FLONASE) 50 MCG/ACT nasal spray     ibuprofen (ADVIL/MOTRIN) 800 MG tablet     loratadine-pseudoePHEDrine (CLARITIN-D 24-HOUR)  MG 24 hr tablet     olopatadine (PATADAY) 0.2 % ophthalmic solution     olopatadine (PATANOL) 0.1 % ophthalmic solution     pantoprazole (PROTONIX) 40 MG EC tablet     TRINTELLIX 20 MG tablet       Allergies:  Dogs and Pollen extract    Relevant labs, images, EKGs, Epic and outside hospital (if applicable) charts were reviewed. The findings, diagnosis, plan, and need for follow up were discussed with the patient/family. Nursing notes were reviewed.      Jose Samano MD  12/06/22 3404

## 2022-12-07 NOTE — ED PROVIDER NOTES
12/07/22   6:24 AM     I am accepting the care of this patient from Dr Moreno at change of shift.  Wellington Moreno is a 51 yo male who came in after feeling sick for weeks with fever, body aches, night sweats, chills. Started on doxycycline for presumed tick borne illness but those tests were negative. T 101.2  F on arrival, other VSS. Past CT abdomena and pelvis was abnormal but U/S at 2130 last night was pretty unremarkable.  Labs show WBC 13,000 initially, now normal, CMP is improved from yesterday with Na 133, alk phos 147, AST 55, ALT 86, protein and albumin low, lipase normal, lactic acid normal, CRP 28, procalcitonin 0.24, 4 Plex negative, UA normal.  Chest xray stable.  He is on Zosyn and vancomycin. BC x 2 pending.      ED Course    I spoke with Dr Dorsey who is bringing the patient into the hospital.     Diagnosis    (A41.9) Sepsis, due to unspecified organism, unspecified whether acute organ dysfunction present (H)    (B17.9) Acute hepatitis      Plan: admission             Max Quinn MD  12/07/22 0812

## 2022-12-07 NOTE — DISCHARGE INSTRUCTIONS
Wellington Dorsey has placed some orders for outpatient care.     Thank you for choosing our Emergency Department for your care.     You may receive a phone call or letter for a survey about your care in our ED.  Please complete this as this is how we improve care for our patients.     If you have any questions after leaving the ED you can call or text me on my cell phone at [phone] and I will get back to you at some point. This does not mean that I am on call and if you are not doing well please return to the ED.     Sincerely,    Dr Dale Quinn M.D.

## 2022-12-08 LAB — ANA SER QL IF: NEGATIVE

## 2022-12-09 LAB
CMV IGM SERPL IA-ACNC: 200 AU/ML
CMV IGM SERPL IA-ACNC: POSITIVE
EBV VCA IGM SER IA-ACNC: >160 U/ML
EBV VCA IGM SER IA-ACNC: ABNORMAL

## 2022-12-11 LAB
BACTERIA BLD CULT: NO GROWTH
BACTERIA BLD CULT: NO GROWTH

## 2022-12-12 LAB
BACTERIA BLD CULT: NO GROWTH
BACTERIA BLD CULT: NO GROWTH

## 2025-06-09 ENCOUNTER — HOSPITAL ENCOUNTER (OUTPATIENT)
Dept: GENERAL RADIOLOGY | Facility: OTHER | Age: 55
Discharge: HOME | End: 2025-06-09
Admitting: RADIOLOGY
Payer: COMMERCIAL

## 2025-06-09 DIAGNOSIS — K21.9 GASTROESOPHAGEAL REFLUX DISEASE: ICD-10-CM

## 2025-06-09 PROCEDURE — 74221 X-RAY XM ESOPHAGUS 2CNTRST: CPT

## 2025-06-09 PROCEDURE — 74221 X-RAY XM ESOPHAGUS 2CNTRST: CPT | Mod: 26 | Performed by: RADIOLOGY

## 2025-06-09 PROCEDURE — 250N000013 HC RX MED GY IP 250 OP 250 PS 637

## 2025-06-09 RX ADMIN — ANTACID/ANTIFLATULENT 4 G: 380; 550; 10; 10 GRANULE, EFFERVESCENT ORAL at 14:22

## (undated) RX ORDER — ACETAMINOPHEN 500 MG
TABLET ORAL
Status: DISPENSED
Start: 2022-12-07

## (undated) RX ORDER — CEFTRIAXONE SODIUM 1 G
VIAL (EA) INJECTION
Status: DISPENSED
Start: 2019-12-07

## (undated) RX ORDER — CEFTRIAXONE SODIUM 1 G
VIAL (EA) INJECTION
Status: DISPENSED
Start: 2020-02-01

## (undated) RX ORDER — DIPHENHYDRAMINE HCL 25 MG
CAPSULE ORAL
Status: DISPENSED
Start: 2022-12-07

## (undated) RX ORDER — LIDOCAINE HYDROCHLORIDE 10 MG/ML
INJECTION, SOLUTION EPIDURAL; INFILTRATION; INTRACAUDAL; PERINEURAL
Status: DISPENSED
Start: 2019-12-07

## (undated) RX ORDER — ACETAMINOPHEN 500 MG
TABLET ORAL
Status: DISPENSED
Start: 2022-12-06

## (undated) RX ORDER — LIDOCAINE HYDROCHLORIDE 10 MG/ML
INJECTION, SOLUTION EPIDURAL; INFILTRATION; INTRACAUDAL; PERINEURAL
Status: DISPENSED
Start: 2020-02-01

## (undated) RX ORDER — LIDOCAINE HYDROCHLORIDE 10 MG/ML
INJECTION, SOLUTION INFILTRATION; PERINEURAL
Status: DISPENSED
Start: 2020-11-24

## (undated) RX ORDER — LIDOCAINE HYDROCHLORIDE 10 MG/ML
INJECTION, SOLUTION EPIDURAL; INFILTRATION; INTRACAUDAL; PERINEURAL
Status: DISPENSED
Start: 2022-06-20